# Patient Record
Sex: MALE | Race: WHITE | URBAN - METROPOLITAN AREA
[De-identification: names, ages, dates, MRNs, and addresses within clinical notes are randomized per-mention and may not be internally consistent; named-entity substitution may affect disease eponyms.]

---

## 2021-11-04 ENCOUNTER — OFFICE VISIT (OUTPATIENT)
Dept: SURGERY | Facility: CLINIC | Age: 56
End: 2021-11-04
Payer: COMMERCIAL

## 2021-11-04 VITALS
SYSTOLIC BLOOD PRESSURE: 138 MMHG | HEIGHT: 63 IN | RESPIRATION RATE: 16 BRPM | HEART RATE: 96 BPM | WEIGHT: 281 LBS | OXYGEN SATURATION: 98 % | BODY MASS INDEX: 49.79 KG/M2 | DIASTOLIC BLOOD PRESSURE: 89 MMHG

## 2021-11-04 DIAGNOSIS — R63.5 WEIGHT GAIN: ICD-10-CM

## 2021-11-04 DIAGNOSIS — I10 HTN (HYPERTENSION), BENIGN: ICD-10-CM

## 2021-11-04 DIAGNOSIS — E11.9 TYPE 2 DIABETES MELLITUS WITHOUT COMPLICATION, WITHOUT LONG-TERM CURRENT USE OF INSULIN (HCC): Primary | ICD-10-CM

## 2021-11-04 DIAGNOSIS — E78.5 DYSLIPIDEMIA: ICD-10-CM

## 2021-11-04 PROCEDURE — 3008F BODY MASS INDEX DOCD: CPT | Performed by: INTERNAL MEDICINE

## 2021-11-04 PROCEDURE — 99204 OFFICE O/P NEW MOD 45 MIN: CPT | Performed by: INTERNAL MEDICINE

## 2021-11-04 PROCEDURE — 3075F SYST BP GE 130 - 139MM HG: CPT | Performed by: INTERNAL MEDICINE

## 2021-11-04 PROCEDURE — 3079F DIAST BP 80-89 MM HG: CPT | Performed by: INTERNAL MEDICINE

## 2021-11-04 RX ORDER — GLIMEPIRIDE 4 MG/1
TABLET ORAL
COMMUNITY
Start: 2021-10-21

## 2021-11-04 RX ORDER — PIOGLITAZONEHYDROCHLORIDE 45 MG/1
TABLET ORAL
COMMUNITY
Start: 2021-10-21

## 2021-11-04 RX ORDER — FLUTICASONE PROPIONATE 50 MCG
SPRAY, SUSPENSION (ML) NASAL
COMMUNITY
Start: 2021-10-01

## 2021-11-04 RX ORDER — ESOMEPRAZOLE MAGNESIUM 40 MG/1
CAPSULE, DELAYED RELEASE ORAL
COMMUNITY
Start: 2021-10-21

## 2021-11-04 RX ORDER — LISINOPRIL AND HYDROCHLOROTHIAZIDE 12.5; 1 MG/1; MG/1
TABLET ORAL
COMMUNITY
Start: 2021-10-21

## 2021-11-04 RX ORDER — SEMAGLUTIDE 1.34 MG/ML
0.25 INJECTION, SOLUTION SUBCUTANEOUS
Qty: 1 EACH | Refills: 2 | Status: SHIPPED | OUTPATIENT
Start: 2021-11-04 | End: 2021-12-02

## 2021-11-04 RX ORDER — ROSUVASTATIN CALCIUM 5 MG/1
TABLET, COATED ORAL
COMMUNITY
Start: 2021-11-01

## 2021-11-04 NOTE — PROGRESS NOTES
[facility]ation Note       Patient:  Gianni Alta Sierra  :      10/30/1965  MRN:      [id number]    Referring Provider: Dr. Ted Gupta       Chief Complaint:  Patient presents with:  Consult  Weight Management      SUBJECTIVE     Hist osteoarthritis    Social History:  Social History    Socioeconomic History      Marital status: Not on file      Spouse name: Not on file      Number of children: Not on file      Years of education: Not on file      Highest education level: Not on file surgical history. Family History:  History reviewed. No pertinent family history.         Typical Dietary Intake:  Breakfast AM Snack Lunch PM Snack Dinner   Cheerios, almond milk, bagel with coffee, omlette  [place], soup, low carb tortillas, soda, darian ulcers. GERD:  The patient states his acid reflux has been well controlled with his current medication. No symptoms of heartburn or indigestion.       Encounter Diagnosis(ses):   Type 2 diabetes mellitus without complication, without long-term current u gain    Dyslipidemia        Jim Grande MD

## 2021-11-09 ENCOUNTER — TELEPHONE (OUTPATIENT)
Dept: SURGERY | Facility: CLINIC | Age: 56
End: 2021-11-09

## 2021-11-09 NOTE — TELEPHONE ENCOUNTER
Out office received P.A request form [address] [place] [address] regarding Ozempic medication. Office completed paperwork and faxed to insurance for approval, unable to locate patient under plan.  I contacted pt and I was informed that he picked up rx at [address] [place] [address] fo

## 2022-03-07 ENCOUNTER — OFFICE VISIT (OUTPATIENT)
Dept: SURGERY | Facility: CLINIC | Age: 57
End: 2022-03-07
Payer: COMMERCIAL

## 2022-03-07 VITALS
WEIGHT: 285.13 LBS | BODY MASS INDEX: 50.52 KG/M2 | DIASTOLIC BLOOD PRESSURE: 74 MMHG | HEIGHT: 63 IN | SYSTOLIC BLOOD PRESSURE: 115 MMHG | HEART RATE: 96 BPM | OXYGEN SATURATION: 95 %

## 2022-03-07 DIAGNOSIS — E11.9 TYPE 2 DIABETES MELLITUS WITHOUT COMPLICATION, WITHOUT LONG-TERM CURRENT USE OF INSULIN (HCC): Primary | ICD-10-CM

## 2022-03-07 DIAGNOSIS — E78.5 DYSLIPIDEMIA: ICD-10-CM

## 2022-03-07 DIAGNOSIS — I10 HTN (HYPERTENSION), BENIGN: ICD-10-CM

## 2022-03-07 DIAGNOSIS — E66.01 MORBID OBESITY WITH BMI OF 50.0-59.9, ADULT (HCC): ICD-10-CM

## 2022-03-07 DIAGNOSIS — R06.83 SNORING: ICD-10-CM

## 2022-03-07 PROCEDURE — 99214 OFFICE O/P EST MOD 30 MIN: CPT | Performed by: INTERNAL MEDICINE

## 2022-03-07 PROCEDURE — 3078F DIAST BP <80 MM HG: CPT | Performed by: INTERNAL MEDICINE

## 2022-03-07 PROCEDURE — 3008F BODY MASS INDEX DOCD: CPT | Performed by: INTERNAL MEDICINE

## 2022-03-07 PROCEDURE — 3074F SYST BP LT 130 MM HG: CPT | Performed by: INTERNAL MEDICINE

## 2022-03-07 RX ORDER — MONTELUKAST SODIUM 10 MG/1
1 TABLET ORAL DAILY
COMMUNITY
Start: 2022-01-20

## 2022-03-07 RX ORDER — IBUPROFEN 800 MG/1
TABLET ORAL
COMMUNITY
Start: 2021-12-21

## 2022-03-07 RX ORDER — SEMAGLUTIDE 1.34 MG/ML
0.25 INJECTION, SOLUTION SUBCUTANEOUS
COMMUNITY
Start: 2022-01-19 | End: 2022-03-07

## 2022-03-07 RX ORDER — SEMAGLUTIDE 1.34 MG/ML
0.25 INJECTION, SOLUTION SUBCUTANEOUS
Qty: 4.5 ML | Refills: 2 | Status: SHIPPED | OUTPATIENT
Start: 2022-03-07 | End: 2022-06-05

## 2022-06-15 ENCOUNTER — TELEMEDICINE (OUTPATIENT)
Dept: SURGERY | Facility: CLINIC | Age: 57
End: 2022-06-15

## 2022-06-15 VITALS — BODY MASS INDEX: 51.21 KG/M2 | HEIGHT: 63 IN | WEIGHT: 289 LBS

## 2022-06-15 DIAGNOSIS — E78.5 DYSLIPIDEMIA: ICD-10-CM

## 2022-06-15 DIAGNOSIS — I10 HTN (HYPERTENSION), BENIGN: ICD-10-CM

## 2022-06-15 DIAGNOSIS — E66.01 MORBID OBESITY WITH BMI OF 50.0-59.9, ADULT (HCC): ICD-10-CM

## 2022-06-15 DIAGNOSIS — R06.83 SNORING: ICD-10-CM

## 2022-06-15 DIAGNOSIS — E11.9 TYPE 2 DIABETES MELLITUS WITHOUT COMPLICATION, WITHOUT LONG-TERM CURRENT USE OF INSULIN (HCC): Primary | ICD-10-CM

## 2022-06-15 PROCEDURE — 99214 OFFICE O/P EST MOD 30 MIN: CPT | Performed by: INTERNAL MEDICINE

## 2022-06-15 PROCEDURE — 3008F BODY MASS INDEX DOCD: CPT | Performed by: INTERNAL MEDICINE

## 2022-06-15 RX ORDER — SEMAGLUTIDE 1.34 MG/ML
0.5 INJECTION, SOLUTION SUBCUTANEOUS
Qty: 4.5 ML | Refills: 2 | Status: SHIPPED | OUTPATIENT
Start: 2022-06-15

## 2022-09-06 ENCOUNTER — TELEPHONE (OUTPATIENT)
Dept: SURGERY | Facility: CLINIC | Age: 57
End: 2022-09-06

## 2023-01-19 ENCOUNTER — OFFICE VISIT (OUTPATIENT)
Dept: SURGERY | Facility: CLINIC | Age: 58
End: 2023-01-19
Payer: COMMERCIAL

## 2023-01-19 VITALS
HEART RATE: 88 BPM | HEIGHT: 63 IN | BODY MASS INDEX: 51.26 KG/M2 | DIASTOLIC BLOOD PRESSURE: 81 MMHG | OXYGEN SATURATION: 93 % | SYSTOLIC BLOOD PRESSURE: 117 MMHG | WEIGHT: 289.31 LBS

## 2023-01-19 DIAGNOSIS — E78.5 DYSLIPIDEMIA: ICD-10-CM

## 2023-01-19 DIAGNOSIS — E66.01 MORBID OBESITY WITH BMI OF 50.0-59.9, ADULT (HCC): ICD-10-CM

## 2023-01-19 DIAGNOSIS — I10 HTN (HYPERTENSION), BENIGN: ICD-10-CM

## 2023-01-19 DIAGNOSIS — R06.83 SNORING: ICD-10-CM

## 2023-01-19 DIAGNOSIS — E11.9 TYPE 2 DIABETES MELLITUS WITHOUT COMPLICATION, WITHOUT LONG-TERM CURRENT USE OF INSULIN (HCC): Primary | ICD-10-CM

## 2023-01-19 PROCEDURE — 3079F DIAST BP 80-89 MM HG: CPT | Performed by: INTERNAL MEDICINE

## 2023-01-19 PROCEDURE — 99214 OFFICE O/P EST MOD 30 MIN: CPT | Performed by: INTERNAL MEDICINE

## 2023-01-19 PROCEDURE — 3008F BODY MASS INDEX DOCD: CPT | Performed by: INTERNAL MEDICINE

## 2023-01-19 PROCEDURE — 3074F SYST BP LT 130 MM HG: CPT | Performed by: INTERNAL MEDICINE

## 2023-01-19 RX ORDER — TIRZEPATIDE 5 MG/.5ML
5 INJECTION, SOLUTION SUBCUTANEOUS WEEKLY
Qty: 6 ML | Refills: 1 | Status: SHIPPED | OUTPATIENT
Start: 2023-01-19

## 2023-01-25 ENCOUNTER — DOCUMENTATION ONLY (OUTPATIENT)
Dept: SURGERY | Facility: CLINIC | Age: 58
End: 2023-01-25

## 2023-02-02 DIAGNOSIS — E66.01 MORBID OBESITY WITH BMI OF 50.0-59.9, ADULT (HCC): ICD-10-CM

## 2023-02-02 DIAGNOSIS — I10 HTN (HYPERTENSION), BENIGN: ICD-10-CM

## 2023-02-02 DIAGNOSIS — E11.9 TYPE 2 DIABETES MELLITUS WITHOUT COMPLICATION, WITHOUT LONG-TERM CURRENT USE OF INSULIN (HCC): Primary | ICD-10-CM

## 2023-05-25 ENCOUNTER — OFFICE VISIT (OUTPATIENT)
Dept: SURGERY | Facility: CLINIC | Age: 58
End: 2023-05-25
Payer: COMMERCIAL

## 2023-05-25 VITALS
BODY MASS INDEX: 51.14 KG/M2 | DIASTOLIC BLOOD PRESSURE: 79 MMHG | WEIGHT: 288.63 LBS | HEIGHT: 63 IN | SYSTOLIC BLOOD PRESSURE: 120 MMHG | HEART RATE: 97 BPM | OXYGEN SATURATION: 92 %

## 2023-05-25 DIAGNOSIS — I10 HTN (HYPERTENSION), BENIGN: ICD-10-CM

## 2023-05-25 DIAGNOSIS — E78.5 DYSLIPIDEMIA: ICD-10-CM

## 2023-05-25 DIAGNOSIS — E66.01 MORBID OBESITY WITH BMI OF 50.0-59.9, ADULT (HCC): ICD-10-CM

## 2023-05-25 DIAGNOSIS — R06.83 SNORING: ICD-10-CM

## 2023-05-25 DIAGNOSIS — E11.9 TYPE 2 DIABETES MELLITUS WITHOUT COMPLICATION, WITHOUT LONG-TERM CURRENT USE OF INSULIN (HCC): Primary | ICD-10-CM

## 2023-05-25 PROCEDURE — 3078F DIAST BP <80 MM HG: CPT | Performed by: INTERNAL MEDICINE

## 2023-05-25 PROCEDURE — 3074F SYST BP LT 130 MM HG: CPT | Performed by: INTERNAL MEDICINE

## 2023-05-25 PROCEDURE — 99214 OFFICE O/P EST MOD 30 MIN: CPT | Performed by: INTERNAL MEDICINE

## 2023-05-25 PROCEDURE — 3008F BODY MASS INDEX DOCD: CPT | Performed by: INTERNAL MEDICINE

## 2023-10-12 ENCOUNTER — OFFICE VISIT (OUTPATIENT)
Dept: SURGERY | Facility: CLINIC | Age: 58
End: 2023-10-12
Payer: COMMERCIAL

## 2023-10-12 VITALS
DIASTOLIC BLOOD PRESSURE: 75 MMHG | SYSTOLIC BLOOD PRESSURE: 120 MMHG | WEIGHT: 272.19 LBS | BODY MASS INDEX: 48.23 KG/M2 | HEIGHT: 63 IN | HEART RATE: 98 BPM | OXYGEN SATURATION: 94 %

## 2023-10-12 DIAGNOSIS — I10 HTN (HYPERTENSION), BENIGN: ICD-10-CM

## 2023-10-12 DIAGNOSIS — E78.5 DYSLIPIDEMIA: ICD-10-CM

## 2023-10-12 DIAGNOSIS — E11.9 TYPE 2 DIABETES MELLITUS WITHOUT COMPLICATION, WITHOUT LONG-TERM CURRENT USE OF INSULIN (HCC): Primary | ICD-10-CM

## 2023-10-12 PROCEDURE — 99214 OFFICE O/P EST MOD 30 MIN: CPT | Performed by: INTERNAL MEDICINE

## 2023-10-12 PROCEDURE — 3008F BODY MASS INDEX DOCD: CPT | Performed by: INTERNAL MEDICINE

## 2023-10-12 PROCEDURE — 3074F SYST BP LT 130 MM HG: CPT | Performed by: INTERNAL MEDICINE

## 2023-10-12 PROCEDURE — 3078F DIAST BP <80 MM HG: CPT | Performed by: INTERNAL MEDICINE

## 2024-02-12 ENCOUNTER — OFFICE VISIT (OUTPATIENT)
Dept: ENDOCRINOLOGY CLINIC | Facility: CLINIC | Age: 59
End: 2024-02-12
Payer: COMMERCIAL

## 2024-02-12 VITALS
DIASTOLIC BLOOD PRESSURE: 81 MMHG | HEIGHT: 63 IN | SYSTOLIC BLOOD PRESSURE: 124 MMHG | HEART RATE: 103 BPM | BODY MASS INDEX: 49.43 KG/M2 | WEIGHT: 279 LBS

## 2024-02-12 DIAGNOSIS — R73.09 HIGH GLUCOSE LEVEL: ICD-10-CM

## 2024-02-12 DIAGNOSIS — E11.9 TYPE 2 DIABETES MELLITUS WITHOUT COMPLICATION, WITHOUT LONG-TERM CURRENT USE OF INSULIN (HCC): Primary | ICD-10-CM

## 2024-02-12 DIAGNOSIS — E11.65 UNCONTROLLED TYPE 2 DIABETES MELLITUS WITH HYPERGLYCEMIA (HCC): ICD-10-CM

## 2024-02-12 DIAGNOSIS — I10 HTN (HYPERTENSION), BENIGN: ICD-10-CM

## 2024-02-12 DIAGNOSIS — E78.5 DYSLIPIDEMIA: ICD-10-CM

## 2024-02-12 DIAGNOSIS — E11.65 HYPERGLYCEMIA DUE TO DIABETES MELLITUS (HCC): ICD-10-CM

## 2024-02-12 DIAGNOSIS — E66.01 CLASS 3 SEVERE OBESITY WITH BODY MASS INDEX (BMI) OF 45.0 TO 49.9 IN ADULT, UNSPECIFIED OBESITY TYPE, UNSPECIFIED WHETHER SERIOUS COMORBIDITY PRESENT (HCC): ICD-10-CM

## 2024-02-12 LAB
CARTRIDGE EXPIRATION DATE: ABNORMAL DATE
CARTRIDGE LOT#: ABNORMAL NUMERIC
GLUCOSE BLOOD: 236
HEMOGLOBIN A1C: 8.9 % (ref 4.3–5.6)
TEST STRIP EXPIRATION DATE: NORMAL DATE
TEST STRIP LOT #: NORMAL NUMERIC

## 2024-02-12 RX ORDER — SEMAGLUTIDE 0.68 MG/ML
INJECTION, SOLUTION SUBCUTANEOUS
COMMUNITY
Start: 2023-11-21 | End: 2024-02-12

## 2024-02-12 RX ORDER — ACYCLOVIR 400 MG/1
1 TABLET ORAL
Qty: 9 EACH | Refills: 1 | Status: SHIPPED | OUTPATIENT
Start: 2024-02-12 | End: 2024-08-10

## 2024-02-12 RX ORDER — EMPAGLIFLOZIN 25 MG/1
1 TABLET, FILM COATED ORAL DAILY
Qty: 270 TABLET | Refills: 0 | Status: SHIPPED | OUTPATIENT
Start: 2024-02-12 | End: 2024-05-12

## 2024-02-12 RX ORDER — TIRZEPATIDE 2.5 MG/.5ML
2.5 INJECTION, SOLUTION SUBCUTANEOUS
Qty: 2 ML | Refills: 3 | Status: SHIPPED | OUTPATIENT
Start: 2024-02-12

## 2024-02-12 RX ORDER — EMPAGLIFLOZIN 25 MG/1
1 TABLET, FILM COATED ORAL DAILY
Qty: 270 TABLET | Refills: 0 | Status: SHIPPED | OUTPATIENT
Start: 2024-02-12 | End: 2024-02-12

## 2024-02-12 RX ORDER — TIRZEPATIDE 2.5 MG/.5ML
2.5 INJECTION, SOLUTION SUBCUTANEOUS
Qty: 2 ML | Refills: 3 | Status: SHIPPED | OUTPATIENT
Start: 2024-02-12 | End: 2024-02-12

## 2024-02-12 RX ORDER — METFORMIN HYDROCHLORIDE 500 MG/1
1000 TABLET, EXTENDED RELEASE ORAL 2 TIMES DAILY WITH MEALS
Qty: 360 TABLET | Refills: 1 | Status: SHIPPED | OUTPATIENT
Start: 2024-02-12 | End: 2024-08-10

## 2024-02-12 NOTE — PATIENT INSTRUCTIONS
Target A1c 6.5%  Target BG   BEFORE MEAL 100-130mg/dl  2hrs AFTER MEAL less than 180mg/dl    Will give lab orders to do with PCP labs next month    Will get sleep study soon     Will refer to CDE     Will send Rx G7 dexcom     Follow up with podiatry and eye doctor annually.     .Metformin start with 500 mg daily for a week, increase to 500 mg twice a day for a week, then 1000 mg at night and 500 mg in the morning for a week, then increase to 1000 mg twice day. If getting diarrhea, wait and do not increase the dose till the diarrhea resolves.     Mounjaro 2.5 mg weekly - if not covered , will increase ozempic to 0.5 mg weekly     Pioglitzone 45 - might cause leg swelling so will consider 30 mg    Will add jardiance 25 mg     Basaglar 20 units     Continue crestor 20     Check blood sugar fasting, before meals and before bedtime.   If you have low blood sugar <70, take 15 grams of carb (8 oz juice or regular soda) and recheck in 15 minutes.      Patients need to wear covered shoes all the time and check feet daily.   Bring your meter/BG log to the next visit.        GLP-1 agonists:  No personal or family history of MEN syndrome   No personal history pancreatitis   Patient counselled regarding side effects including injection site reactions, nausea, vomiting, diarrhea, pancreatitis, gastroparesis and rare side effect rika Robinson syndrome.    SGLT2 inhibitors  No UTI or yeast infection   Discussed side effects including UTI and fungal infections.   Discussed the importance of hydration.

## 2024-02-12 NOTE — PROGRESS NOTES
New Patient Evaluation - History and Physical    CONSULT - Reason for Visit:  DM.  Requesting Physician:   SARBJIT CHAUHAN      CHIEF COMPLAINT:    Chief Complaint   Patient presents with    Consult     Diabetes   Testosterone  Thyroid    Wood Evans MD        HISTORY OF PRESENT ILLNESS:   Fredi Romano is a 58 year old male who presents with uncontrolled DM, DLP, obese     Was seen with his wife    Lab Results   Component Value Date    A1C 8.9 (A) 2024        Seeing Dr Draper   Does not want bariatric surgery now  Has GERD   He asked about testosterone and thyroid    Dx with Dmt2  5 yrs ago  Lowest A1c 7 but was up to 11 before   Has dexcom   Data showed TIR 2, high 45, very high 53, no low   GMI  N/A   and most of the time is higher     On   Metformin 1000 mg once a day , night dose gives him side effect  Pioglitazone 45 mg /day   Basaglar 20 units for the last 6 mo   Ozempic 0.25 - had GI SE from 1 mg . Lost wt on it   Stopped WRIGHT when started insulin     Worried about DM complications     [place]ired . Lives between [place] and [place].   Mother lives at home and has stress at home    in [place] he is more active   Gaining wt in [place].       DM quality measures:  A1C/Blood pressure: as reported above   Nephropathy screenin/2023 , not on ace /arb rx.   LIPID screenin/2023 .   On statin rx.   Last dilated eye exam: No data recorded    Exam shows retinopathy? No data recorded no  Last diabetic foot exam: No data recorded  Dentist : recommend every 6m  Neuropathy:  yes in hands but can be from carpel tunnel   CAD/ASCVD/PAD/CVA: no    GLP-1 agonists:  No personal or family history of MEN syndrome   No personal history pancreatitis   Patient counselled regarding side effects including injection site reactions, nausea, vomiting, diarrhea, pancreatitis, gastroparesis and rare side effect rika Robinson syndrome.    SGLT2 inhibitors  No UTI or yeast infection   Discussed side effects including  UTI and fungal infections.   Discussed the importance of hydration.    A/P  Uncontrolled complicated t2DM, hyperglycemia, obese , DLP  Had GI SE from ozempic   Will titrate up metformin, GLP-1/GIP, and add SGLT2i   Might lower TZD later     Target A1c 6.5%  Target BG   BEFORE MEAL 100-130mg/dl  2hrs AFTER MEAL less than 180mg/dl    Plan  Will give lab orders to do with PCP labs next month  Will get sleep study soon   Will refer to CDE  Will send Rx G7 dexcom   Follow up with podiatry and eye doctor annually.     .Metformin start with 500 mg daily for a week, increase to 500 mg twice a day for a week, then 1000 mg at night and 500 mg in the morning for a week, then increase to 1000 mg twice day. If getting diarrhea, wait and do not increase the dose till the diarrhea resolves.     Mounjaro 2.5 mg weekly - if not covered , will increase ozempic to 0.5 mg weekly  Pioglitzone 45 - might cause leg swelling so will consider 30 mg  Will add jardiance 25 mg   Basaglar 20 units   Continue crestor 20   Check blood sugar fasting, before meals and before bedtime.   If you have low blood sugar <70, take 15 grams of carb (8 oz juice or regular soda) and recheck in 15 minutes.      Patients need to wear covered shoes all the time and check feet daily.   Bring your meter/BG log to the next visit.      GLP-1 agonists:  No personal or family history of MEN syndrome   No personal history pancreatitis   Patient counselled regarding side effects including injection site reactions, nausea, vomiting, diarrhea, pancreatitis, gastroparesis and rare side effect rika Robinson syndrome.    SGLT2 inhibitors  No UTI or yeast infection   Discussed side effects including UTI and fungal infections.   Discussed the importance of hydration.        PAST MEDICAL HISTORY:   Past Medical History:   Diagnosis Date    DM2 (diabetes mellitus, type 2) ([facility])     Dyslipidemia     Morbid obesity with BMI of 45.0-49.9, adult ([facility])        PAST SURGICAL HISTORY:    History reviewed. No pertinent surgical history.  Knee surgery   Carpel tunnel      CURRENT MEDICATIONS:     metFORMIN  MG Oral Tablet 24 Hr Take 2 tablets (1,000 mg total) by mouth 2 (two) times daily with meals. 360 tablet 1    Empagliflozin (JARDIANCE) 25 MG Oral Tab Take 1 each by mouth daily. 270 tablet 0    Continuous Blood Gluc Sensor (DEXCOM G7 SENSOR) Does not apply Misc 1 each Every 10 days. Use as directed every 10 days 9 each 1    Tirzepatide (MOUNJARO) 2.5 MG/0.5ML Subcutaneous Solution Pen-injector Inject 2.5 mg into the skin every 7 days. 2 mL 3    montelukast 10 MG Oral Tab Take 1 tablet (10 mg total) by mouth daily.      ibuprofen 800 MG Oral Tab TAKE ONE TABLET BY MOUTH THREE TIMES DAILY FOR SEVERE PAIN      Esomeprazole Magnesium 40 MG Oral Capsule Delayed Release       lisinopril-hydroCHLOROthiazide 10-12.5 MG Oral Tab       rosuvastatin 5 MG Oral Tab       pioglitazone 45 MG Oral Tab       fluticasone propionate 50 MCG/ACT Nasal Suspension            ALLERGIES:  No Known Allergies    SOCIAL HISTORY:    Social History     Socioeconomic History    Marital status:    Tobacco Use    Smoking status: Never    Smokeless tobacco: Never   Substance and Sexual Activity    Alcohol use: Yes     Comment: occasionally    Drug use: Never       FAMILY HISTORY:   History reviewed. No pertinent family history.   DM in [place]  Mother with 2 TIAs   Aunt with dementia      REVIEW OF SYSTEMS:  All negative other than HPI      PHYSICAL EXAM:   Height: 5' 3\" (160 cm) (02/12 1447)  Weight: 279 lb (126.6 kg) (02/12 1447)  BSA (Calculated - sq m): 2.23 sq meters (02/12 1447)  Pulse: 103 (02/12 1447)  BP: 124/81 (02/12 1447)  Temp: --  Do Not Use - Resp Rate: --  SpO2: --    Body mass index is 49.42 kg/m².  Obese   Pedal edema   No goiter   No striae   Has skin tags    CONSTITUTIONAL:  Awake and alert. Age appropriate, good hygiene not in acute distress. Well nourished and well developed. no acute distress    PSYCH:   Orientated to time, place, person & situation, Normal mood and affect, memory intact, normal insight and judgment, cooperative  Neuro: speech is clear. Awake, alert, no aphasia, no facial asymmetry, no nuchal rigidity  EYES:  No proptosis, no ptosis, conjunctiva normal  ENT:  Normocephalic, atraumatic  Eye: EOMI, normal lids, no discharge, no conjunctival erythema. No exophthalmos/proptosis, Ptosis negative   No rhinorrhea, moist oral mucosa  Neck: full range of motion  Neck/Thyroid: neck inspection: normal, No scar, No goiter   LUNGS:  No acute respiratory distress, non-labored respiration. Speaking full sentences  CARDIOVASCULAR:  regular rate   ABDOMEN:  No abdominal pain/tender  SKIN:  no bruising or bleeding, no rashes and no lesions, Skin is dry, no obvious rashes or lesions  EXTREMITIES: no gross abnormality   MSK: Moves extremities spontaneously. full range of motion in all major joints      DATA:     Pertinent data reviewed     Component 09/29/2023 06/06/2023 01/18/2023 05/11/2022 09/22/2021 04/15/2021            TSH 0.75 1.10 1.00 0.87 1.10 0.88     LDL Cholesterol 77              No results for input(s): \"TSH\", \"T4F\", \"T3F\", \"THYP\" in the last 72 hours.  No results found.    POC HemoCue Glucose 201 (Finger stick glucose)        Component Value Flag Ref Range Units Status    GLUCOSE BLOOD 236        Final    Test Strip Lot # [id number]       Numeric Final    Test Strip Expiration Date 83,024       Date Final                  POC Glycohemoglobin [42561]        Component Value Flag Ref Range Units Status    HEMOGLOBIN A1C 8.9      4.3 - 5.6 % Final    Cartridge Lot# [id number]       Numeric Final    Cartridge Expiration Date 113,025       Date Final                    Orders Placed This Encounter   Procedures    POC HemoCue Glucose 201 (Finger stick glucose)    POC Glycohemoglobin [93863]    Microalb/Creat Ratio, Random Urine    Lipid Panel    Comp Metabolic Panel (14)    TSH W Reflex To Free T4      Orders Placed This Encounter    POC HemoCue Glucose 201 (Finger stick glucose)     Order Specific Question:   Release to patient     Answer:   Immediate    POC Glycohemoglobin [68577]     Order Specific Question:   Release to patient     Answer:   Immediate    Microalb/Creat Ratio, Random Urine     Standing Status:   Future     Standing Expiration Date:   2025     Order Specific Question:   Release to patient     Answer:   Immediate    Lipid Panel     Standing Status:   Future     Standing Expiration Date:   2025     Order Specific Question:   Release to patient     Answer:   Immediate    Comp Metabolic Panel (14)    TSH W Reflex To Free T4     Standing Status:   Future     Standing Expiration Date:   2025     Order Specific Question:   Release to patient     Answer:   Immediate    DISCONTD: OZEMPIC, 0.25 OR 0.5 MG/DOSE, 2 MG/3ML Subcutaneous Solution Pen-injector    metFORMIN  MG Oral Tablet 24 Hr     Sig: Take 2 tablets (1,000 mg total) by mouth 2 (two) times daily with meals.     Dispense:  360 tablet     Refill:  1    DISCONTD: Empagliflozin (JARDIANCE) 25 MG Oral Tab     Sig: Take 1 each by mouth daily.     Dispense:  270 tablet     Refill:  0    DISCONTD: Tirzepatide (MOUNJARO) 2.5 MG/0.5ML Subcutaneous Solution Pen-injector     Sig: Inject 2.5 mg into the skin every 7 days.     Dispense:  2 mL     Refill:  3    Empagliflozin (JARDIANCE) 25 MG Oral Tab     Sig: Take 1 each by mouth daily.     Dispense:  270 tablet     Refill:  0    Continuous Blood Gluc Sensor (DEXCOM G7 SENSOR) Does not apply Misc     Si each Every 10 days. Use as directed every 10 days     Dispense:  9 each     Refill:  1    Tirzepatide (MOUNJARO) 2.5 MG/0.5ML Subcutaneous Solution Pen-injector     Sig: Inject 2.5 mg into the skin every 7 days.     Dispense:  2 mL     Refill:  3          This is a specialized patient consultation in endocrinology and required comprehensive review of prior records, as well as  current evaluation, with time required for consideration of complex endocrine issues and consultation. For this visit, I personally interviewed the patient, and family member if accompanied, performed the pertinent parts of the history and physical examination. ROS included screening for appropriate endocrine conditions.   Today's diagnosis and plan were reviewed in detail with the patient who states understanding and agrees with plan. I discussed with the patient possible diagnosis, differential diagnosis, need for work up , treatment options, alternatives and side effects.     Please see note for details about time spent which includes:   · pre-visit preparation  · reviewing records  · face to face time with the patient   · timely documentation of the encounter  · ordering medications/tests  · communication with care team  · care coordination    I appreciate the opportunity to be part of your patient's medical care and will keep you, as the referring and primary physicians, informed about the care of your patient, including possible future surgery and pathology findings. Please feel free to contact me should you have any questions.      Rodney Staley MD

## 2024-03-11 ENCOUNTER — OFFICE VISIT (OUTPATIENT)
Dept: ENDOCRINOLOGY CLINIC | Facility: CLINIC | Age: 59
End: 2024-03-11

## 2024-03-11 VITALS
DIASTOLIC BLOOD PRESSURE: 60 MMHG | HEIGHT: 63 IN | SYSTOLIC BLOOD PRESSURE: 113 MMHG | BODY MASS INDEX: 48.37 KG/M2 | WEIGHT: 273 LBS | HEART RATE: 108 BPM

## 2024-03-11 DIAGNOSIS — E11.9 TYPE 2 DIABETES MELLITUS WITHOUT COMPLICATION, WITHOUT LONG-TERM CURRENT USE OF INSULIN (HCC): Primary | ICD-10-CM

## 2024-03-11 DIAGNOSIS — E11.65 HYPERGLYCEMIA DUE TO DIABETES MELLITUS (HCC): ICD-10-CM

## 2024-03-11 DIAGNOSIS — E66.01 CLASS 3 SEVERE OBESITY WITH BODY MASS INDEX (BMI) OF 45.0 TO 49.9 IN ADULT, UNSPECIFIED OBESITY TYPE, UNSPECIFIED WHETHER SERIOUS COMORBIDITY PRESENT (HCC): ICD-10-CM

## 2024-03-11 DIAGNOSIS — I10 HTN (HYPERTENSION), BENIGN: ICD-10-CM

## 2024-03-11 DIAGNOSIS — E78.5 DYSLIPIDEMIA: ICD-10-CM

## 2024-03-11 DIAGNOSIS — E11.65 UNCONTROLLED TYPE 2 DIABETES MELLITUS WITH HYPERGLYCEMIA (HCC): ICD-10-CM

## 2024-03-11 DIAGNOSIS — R73.09 HIGH GLUCOSE LEVEL: ICD-10-CM

## 2024-03-11 DIAGNOSIS — E11.649 HYPOGLYCEMIA ASSOCIATED WITH DIABETES (HCC): ICD-10-CM

## 2024-03-11 PROBLEM — E66.813 CLASS 3 SEVERE OBESITY WITH BODY MASS INDEX (BMI) OF 45.0 TO 49.9 IN ADULT: Status: ACTIVE | Noted: 2024-03-11

## 2024-03-11 PROBLEM — E66.813 CLASS 3 SEVERE OBESITY WITH BODY MASS INDEX (BMI) OF 45.0 TO 49.9 IN ADULT (HCC): Status: ACTIVE | Noted: 2024-03-11

## 2024-03-11 PROCEDURE — 3078F DIAST BP <80 MM HG: CPT | Performed by: INTERNAL MEDICINE

## 2024-03-11 PROCEDURE — 3074F SYST BP LT 130 MM HG: CPT | Performed by: INTERNAL MEDICINE

## 2024-03-11 PROCEDURE — 3008F BODY MASS INDEX DOCD: CPT | Performed by: INTERNAL MEDICINE

## 2024-03-11 PROCEDURE — 99214 OFFICE O/P EST MOD 30 MIN: CPT | Performed by: INTERNAL MEDICINE

## 2024-03-11 RX ORDER — TIRZEPATIDE 7.5 MG/.5ML
7.5 INJECTION, SOLUTION SUBCUTANEOUS
Qty: 2 ML | Refills: 0 | Status: SHIPPED | OUTPATIENT
Start: 2024-05-06 | End: 2024-06-03

## 2024-03-11 RX ORDER — DOCUSATE SODIUM 100 MG/1
100 CAPSULE, LIQUID FILLED ORAL 2 TIMES DAILY
Qty: 180 CAPSULE | Refills: 0 | Status: SHIPPED | OUTPATIENT
Start: 2024-03-11 | End: 2024-06-09

## 2024-03-11 RX ORDER — ACYCLOVIR 400 MG/1
1 TABLET ORAL
Qty: 9 EACH | Refills: 1 | Status: SHIPPED | OUTPATIENT
Start: 2024-03-11 | End: 2024-09-07

## 2024-03-11 RX ORDER — BLOOD SUGAR DIAGNOSTIC
1 STRIP MISCELLANEOUS 3 TIMES DAILY
Qty: 300 STRIP | Refills: 1 | Status: SHIPPED | OUTPATIENT
Start: 2024-03-11 | End: 2024-06-09

## 2024-03-11 RX ORDER — TIRZEPATIDE 5 MG/.5ML
5 INJECTION, SOLUTION SUBCUTANEOUS
Qty: 2 ML | Refills: 0 | Status: SHIPPED | OUTPATIENT
Start: 2024-04-08 | End: 2024-05-06

## 2024-03-11 RX ORDER — ROSUVASTATIN CALCIUM 20 MG/1
20 TABLET, COATED ORAL NIGHTLY
Qty: 90 TABLET | Refills: 0 | Status: SHIPPED | OUTPATIENT
Start: 2024-03-11 | End: 2024-06-09

## 2024-03-11 NOTE — PATIENT INSTRUCTIONS
Will give lab orders to do with PCP labs next month  Will get sleep study soon   Will refer to CDE  Will send Rx G7 dexcom and meter/strips  Follow up with podiatry and eye doctor annually.     . Metformin  1000 mg at night and 500 mg in the morning for a week, then increase to 1000 mg twice day. If getting diarrhea, wait and do not increase the dose till the diarrhea resolves.     Stop ozempic     Will start Mounjaro 2.5 mg weekly and titrate every month if tolerated 5 mg/week then 7.5mg/week   Pioglitzone 45 - might cause leg swelling so will consider 30 mg  Jardiance 25 mg   Basaglar  20 units   Crestor 10 -->  20 mg/day  Will get sleep study soon      Target BG   BEFORE MEAL 100-130mg/dl  2hrs AFTER MEAL less than 180mg/dl    Check blood sugar fasting, before meals and before bedtime.   If you have low blood sugar <70, take 15 grams of carb (8 oz juice or regular soda) and recheck in 15 minutes.      Patients need to wear covered shoes all the time and check feet daily.   Bring your meter/BG log to the next visit.

## 2024-03-11 NOTE — PROGRESS NOTES
follow up Visit:  DM.    Requesting Physician:   SARBJIT CHAUHAN      CHIEF COMPLAINT:    Chief Complaint   Patient presents with    Diabetes     Diabetes lasy A1c 8.9 24        HISTORY OF PRESENT ILLNESS:   Fredi Romano is a 58 year old male who presents with uncontrolled DM, DLP, obese     Lab Results   Component Value Date    A1C 8.9 (A) 2024        Seeing Dr Draper   Has GERD   Has allergy  Tired now - will get Sleep study soon   CGM fell in [place]   Started Jardiance     Dx with T2DM  5 yrs ago  Lowest A1c 7 but was up to 11 before   Has dexcom but did not use it recently - fell     Needs a new CGM and meter    Has mounjaro but has not started it   Started Jardiance and had low BG when was more active       On   Metformin 1000 mg once a day , night dose gives him side effect  Pioglitazone 45 mg /day   Jardiance 25  Basaglar 20 units for the last 6 mo   Ozempic 0.25 - had GI SE from 1 mg . Lost wt on it   Stopped WRIGHT when started insulin     Worried about DM complications     Retired . Lives between [place] and [place].   Mother lives at home and has stress at home    in [place] he is more active     DM quality measures:  A1C/Blood pressure: as reported above   Nephropathy screenin/2023 , not on ace /arb rx.   LIPID screenin/2023 .   On statin rx.   Last dilated eye exam: No data recorded    Exam shows retinopathy? No data recorded no  Last diabetic foot exam: No data recorded  Dentist : recommend every 6m  Neuropathy:  yes in hands but can be from carpel tunnel   CAD/ASCVD/PAD/CVA: no          A/P  Uncontrolled complicated t2DM, hyperglycemia, obese , DLP, hypoglycemia .    Might lower TZD later and insulin later     Target A1c 6.5%  Target BG   BEFORE MEAL 100-130mg/dl  2hrs AFTER MEAL less than 180mg/dl    Plan  Will give lab orders to do with PCP labs   Will get sleep study soon   Will refer to CDE  Will send Rx G7 dexcom and meter/strips  Follow up with podiatry and eye doctor  annually.     . Metformin  1000 mg at night and 500 mg in the morning for a week, then increase to 1000 mg twice day. If getting diarrhea, wait and do not increase the dose till the diarrhea resolves.     Stop ozempic     Will start Mounjaro 2.5 mg weekly and titrate every month if tolerated 5 mg/week then 7.5mg/week   Pioglitzone 45 - might cause leg swelling so will consider 30 mg  Jardiance 25 mg   Basaglar  20 units   Crestor 10 -->  20 mg/day  Will get sleep study soon      Target BG   BEFORE MEAL 100-130mg/dl  2hrs AFTER MEAL less than 180mg/dl    Check blood sugar fasting, before meals and before bedtime.   If you have low blood sugar <70, take 15 grams of carb (8 oz juice or regular soda) and recheck in 15 minutes.      Patients need to wear covered shoes all the time and check feet daily.   Bring your meter/BG log to the next visit.                PAST MEDICAL HISTORY:   Past Medical History:   Diagnosis Date    DM2 (diabetes mellitus, type 2) ([facility]     Dyslipidemia     Morbid obesity with BMI of 45.0-49.9, adult ([facility])        PAST SURGICAL HISTORY:   History reviewed. No pertinent surgical history.  Knee surgery   Carpel tunnel      CURRENT MEDICATIONS:     Glucose Blood (BLOOD GLUCOSE TEST STRIPS 333) In Vitro Strip 1 each by In Vitro route 3 (three) times daily. 300 strip 1    Blood Gluc Meter Disp-Strips Does not apply Device Check BG 3x/day 1 each 0    Continuous Blood Gluc Sensor (DEXCOM G7 SENSOR) Does not apply Misc 1 each Every 10 days. Use as directed every 10 days 9 each 1    docusate sodium 100 MG Oral Cap Take 1 capsule (100 mg total) by mouth 2 (two) times daily. 180 capsule 0    [START ON 4/8/2024] Tirzepatide (MOUNJARO) 5 MG/0.5ML Subcutaneous Solution Pen-injector Inject 5 mg into the skin every 7 days for 28 days. 2 mL 0    [START ON 5/6/2024] Tirzepatide (MOUNJARO) 7.5 MG/0.5ML Subcutaneous Solution Pen-injector Inject 7.5 mg into the skin every 7 days for 28 days. 2 mL 0    rosuvastatin  20 MG Oral Tab Take 1 tablet (20 mg total) by mouth nightly. 90 tablet 0    metFORMIN  MG Oral Tablet 24 Hr Take 2 tablets (1,000 mg total) by mouth 2 (two) times daily with meals. 360 tablet 1    Empagliflozin (JARDIANCE) 25 MG Oral Tab Take 1 each by mouth daily. 270 tablet 0    Continuous Blood Gluc Sensor (DEXCOM G7 SENSOR) Does not apply Misc 1 each Every 10 days. Use as directed every 10 days 9 each 1    Tirzepatide (MOUNJARO) 2.5 MG/0.5ML Subcutaneous Solution Pen-injector Inject 2.5 mg into the skin every 7 days. 2 mL 3    montelukast 10 MG Oral Tab Take 1 tablet (10 mg total) by mouth daily.      ibuprofen 800 MG Oral Tab TAKE ONE TABLET BY MOUTH THREE TIMES DAILY FOR SEVERE PAIN      Esomeprazole Magnesium 40 MG Oral Capsule Delayed Release       lisinopril-hydroCHLOROthiazide 10-12.5 MG Oral Tab       pioglitazone 45 MG Oral Tab       fluticasone propionate 50 MCG/ACT Nasal Suspension            ALLERGIES:  No Known Allergies    SOCIAL HISTORY:    Social History     Socioeconomic History    Marital status:    Tobacco Use    Smoking status: Never    Smokeless tobacco: Never   Substance and Sexual Activity    Alcohol use: Yes     Comment: occasionally    Drug use: Never       FAMILY HISTORY:   History reviewed. No pertinent family history.   DM in [place]  Mother with 2 TIAs   Aunt with dementia           PHYSICAL EXAM:   Height: 5' 3\" (160 cm) (03/11 1009)  Weight: 273 lb (123.8 kg) (03/11 1009)  BSA (Calculated - sq m): 2.21 sq meters (03/11 1009)  Pulse: 108 (03/11 1009)  BP: 113/60 (03/11 1009)  Temp: --  Do Not Use - Resp Rate: --  SpO2: --    Body mass index is 48.36 kg/m².  Obese   Pedal edema   No goiter   No striae   Has skin tags    CONSTITUTIONAL:  Awake and alert. Age appropriate, good hygiene not in acute distress. Well nourished and well developed. no acute distress   PSYCH:   Orientated to time, place, person & situation, Normal mood and affect, memory intact, normal insight and  judgment, cooperative       DATA:     Pertinent data reviewed     Component 2023 2023 2023 2022 2021 04/15/2021            TSH 0.75 1.10 1.00 0.87 1.10 0.88     LDL Cholesterol 77              No results for input(s): \"TSH\", \"T4F\", \"T3F\", \"THYP\" in the last 72 hours.  No results found.          No orders of the defined types were placed in this encounter.    Orders Placed This Encounter    Glucose Blood (BLOOD GLUCOSE TEST STRIPS 333) In Vitro Strip     Si each by In Vitro route 3 (three) times daily.     Dispense:  300 strip     Refill:  1    Blood Gluc Meter Disp-Strips Does not apply Device     Sig: Check BG 3x/day     Dispense:  1 each     Refill:  0    Continuous Blood Gluc Sensor (DEXCOM G7 SENSOR) Does not apply Misc     Si each Every 10 days. Use as directed every 10 days     Dispense:  9 each     Refill:  1    docusate sodium 100 MG Oral Cap     Sig: Take 1 capsule (100 mg total) by mouth 2 (two) times daily.     Dispense:  180 capsule     Refill:  0    Tirzepatide (MOUNJARO) 5 MG/0.5ML Subcutaneous Solution Pen-injector     Sig: Inject 5 mg into the skin every 7 days for 28 days.     Dispense:  2 mL     Refill:  0    Tirzepatide (MOUNJARO) 7.5 MG/0.5ML Subcutaneous Solution Pen-injector     Sig: Inject 7.5 mg into the skin every 7 days for 28 days.     Dispense:  2 mL     Refill:  0    rosuvastatin 20 MG Oral Tab     Sig: Take 1 tablet (20 mg total) by mouth nightly.     Dispense:  90 tablet     Refill:  0          This is a specialized patient consultation in endocrinology and required comprehensive review of prior records, as well as current evaluation, with time required for consideration of complex endocrine issues and consultation. For this visit, I personally interviewed the patient, and family member if accompanied, performed the pertinent parts of the history and physical examination. ROS included screening for appropriate endocrine conditions.   Today's  diagnosis and plan were reviewed in detail with the patient who states understanding and agrees with plan. I discussed with the patient possible diagnosis, differential diagnosis, need for work up , treatment options, alternatives and side effects.     Please see note for details about time spent which includes:   · pre-visit preparation  · reviewing records  · face to face time with the patient   · timely documentation of the encounter  · ordering medications/tests  · communication with care team  · care coordination    I appreciate the opportunity to be part of your patient's medical care and will keep you, as the referring and primary physicians, informed about the care of your patient, including possible future surgery and pathology findings. Please feel free to contact me should you have any questions.      Rodney Staley MD

## 2024-03-13 ENCOUNTER — TELEPHONE (OUTPATIENT)
Dept: ENDOCRINOLOGY CLINIC | Facility: CLINIC | Age: 59
End: 2024-03-13

## 2024-03-13 RX ORDER — LANCETS
EACH MISCELLANEOUS
Qty: 300 EACH | Refills: 1 | Status: SHIPPED | OUTPATIENT
Start: 2024-03-13

## 2024-03-13 NOTE — TELEPHONE ENCOUNTER
[facility] calling regarding rx testing supplies, but missing script for rx Accucheck guide lancets, thanks.

## 2024-03-14 ENCOUNTER — OFFICE VISIT (OUTPATIENT)
Dept: SURGERY | Facility: CLINIC | Age: 59
End: 2024-03-14
Payer: COMMERCIAL

## 2024-03-14 VITALS
SYSTOLIC BLOOD PRESSURE: 126 MMHG | OXYGEN SATURATION: 95 % | HEIGHT: 63 IN | HEART RATE: 91 BPM | WEIGHT: 272.81 LBS | DIASTOLIC BLOOD PRESSURE: 76 MMHG | BODY MASS INDEX: 48.34 KG/M2

## 2024-03-14 DIAGNOSIS — I10 HTN (HYPERTENSION), BENIGN: ICD-10-CM

## 2024-03-14 DIAGNOSIS — E11.9 TYPE 2 DIABETES MELLITUS WITHOUT COMPLICATION, WITHOUT LONG-TERM CURRENT USE OF INSULIN (HCC): Primary | ICD-10-CM

## 2024-03-14 DIAGNOSIS — E78.5 DYSLIPIDEMIA: ICD-10-CM

## 2024-03-14 PROCEDURE — 99214 OFFICE O/P EST MOD 30 MIN: CPT | Performed by: INTERNAL MEDICINE

## 2024-03-14 PROCEDURE — 3008F BODY MASS INDEX DOCD: CPT | Performed by: INTERNAL MEDICINE

## 2024-03-14 PROCEDURE — 3074F SYST BP LT 130 MM HG: CPT | Performed by: INTERNAL MEDICINE

## 2024-03-14 PROCEDURE — 3078F DIAST BP <80 MM HG: CPT | Performed by: INTERNAL MEDICINE

## 2024-03-14 NOTE — PROGRESS NOTES
[facility], [facility], [place]  [address]  [facility] [address]  Dept: [phone]       Patient:  Fredi Romano  :      10/30/1965  MRN:      [id number]    Chief Complaint:    Chief Complaint   Patient presents with    Follow - Up    Weight Management     Weight check        SUBJECTIVE     History of Present Illness:  Fredi is being seen today for a follow-up for non surgical weight loss.     Past Medical History:   Past Medical History:   Diagnosis Date    DM2 (diabetes mellitus, type 2) ([facility])     Dyslipidemia     Morbid obesity with BMI of 45.0-49.9, adult ([facility])         Comorbidities:  Joint pain-Improvement?  yes, GERD-Improvement?  yes, Diabetes-Improvement?  yes, ELÍAS-Improvement?  yes, Snoring-Improvement?  yes and Sleep apnea-Improvement?  yes    OBJECTIVE     Vitals: /76   Pulse 91   Ht 5' 3\" (1.6 m)   Wt 272 lb 12.8 oz (123.7 kg)   SpO2 95%   BMI 48.32 kg/m²     Initial weight loss: -00   Total weight loss: -09   Start weight: 281    Wt Readings from Last 3 Encounters:   24 272 lb 12.8 oz (123.7 kg)   24 273 lb (123.8 kg)   24 279 lb (126.6 kg)       Patient Medications:    Current Outpatient Medications   Medication Sig Dispense Refill    Accu-Chek FastClix Lancets Does not apply Misc Use to check blood sugar three times a day 300 each 1    Glucose Blood (BLOOD GLUCOSE TEST STRIPS 333) In Vitro Strip 1 each by In Vitro route 3 (three) times daily. 300 strip 1    Blood Gluc Meter Disp-Strips Does not apply Device Check BG 3x/day 1 each 0    Continuous Blood Gluc Sensor (DEXCOM G7 SENSOR) Does not apply Misc 1 each Every 10 days. Use as directed every 10 days 9 each 1    docusate sodium 100 MG Oral Cap Take 1 capsule (100 mg total) by mouth 2 (two) times daily. 180 capsule 0    [START ON 2024] Tirzepatide (MOUNJARO) 5 MG/0.5ML Subcutaneous Solution Pen-injector Inject 5 mg into the skin every 7 days for 28  days. 2 mL 0    [START ON 5/6/2024] Tirzepatide (MOUNJARO) 7.5 MG/0.5ML Subcutaneous Solution Pen-injector Inject 7.5 mg into the skin every 7 days for 28 days. 2 mL 0    rosuvastatin 20 MG Oral Tab Take 1 tablet (20 mg total) by mouth nightly. 90 tablet 0    metFORMIN  MG Oral Tablet 24 Hr Take 2 tablets (1,000 mg total) by mouth 2 (two) times daily with meals. 360 tablet 1    Empagliflozin (JARDIANCE) 25 MG Oral Tab Take 1 each by mouth daily. 270 tablet 0    Continuous Blood Gluc Sensor (DEXCOM G7 SENSOR) Does not apply Misc 1 each Every 10 days. Use as directed every 10 days 9 each 1    Tirzepatide (MOUNJARO) 2.5 MG/0.5ML Subcutaneous Solution Pen-injector Inject 2.5 mg into the skin every 7 days. 2 mL 3    montelukast 10 MG Oral Tab Take 1 tablet (10 mg total) by mouth daily.      ibuprofen 800 MG Oral Tab TAKE ONE TABLET BY MOUTH THREE TIMES DAILY FOR SEVERE PAIN      Esomeprazole Magnesium 40 MG Oral Capsule Delayed Release       glimepiride 4 MG Oral Tab  (Patient not taking: Reported on 2/12/2024)      lisinopril-hydroCHLOROthiazide 10-12.5 MG Oral Tab       pioglitazone 45 MG Oral Tab       fluticasone propionate 50 MCG/ACT Nasal Suspension        Allergies:  Patient has no known allergies.     Social History:    Social History     Socioeconomic History    Marital status:      Spouse name: Not on file    Number of children: Not on file    Years of education: Not on file    Highest education level: Not on file   Occupational History    Not on file   Tobacco Use    Smoking status: Never    Smokeless tobacco: Never   Substance and Sexual Activity    Alcohol use: Yes     Comment: occasionally    Drug use: Never    Sexual activity: Not on file   Other Topics Concern    Not on file   Social History Narrative    Not on file     Social Determinants of Health     Financial Resource Strain: Not on file   Food Insecurity: Not on file   Transportation Needs: Not on file   Physical Activity: Not on file    Stress: Not on file   Social Connections: Not on file   Housing Stability: Not on file     Surgical History:  No past surgical history on file.  Family History:  No family history on file.    Food Journal  Reviewed and Discussed:       Patient has a Food Journal?: yes   Patient is reading nutrition labels?  yes  Average Caloric Intake:     Average CHO Intake: 120  Is patient exercising? yes  Type of exercise? Continue going to the gym    Eating Habits  Patient states the following:  Eats 3 meal(s) per day  Length of time it takes to consume a meal:  20  # of snacks per day: 1 Type of snacks:  Meat and cheese  Amount of soda consumption per day:  diet  Amount of water (in ounces) per day:  inad  Drinking between meals only:  yes  Toughest challenge:  Exercise     Nutritional Goals  Limit carbohydrates to 100 gms per day, Eat 100-200 calories within 1 hour of waking  and Eat 3-4 cups of fresh fruits or vegetables daily    Behavior Modifications Reviewed and Discussed  Eat breakfast, Eat 3 meals per day, Plan meals in advance, Read nutrition labels, Drink 64 oz of water per day, Maintain a daily food journal, No drinking 30 minutes before or after meals, Utlize portion control strategies to reduce calorie intake, Identify triggers for eating and manage cues and Eat slowly and take 20 to 30 minutes to complete each meal    Exercise Goals Reviewed and Discussed    Continue with increase activity levels    ROS:    Constitutional: positive for fatigue  Respiratory: positive for dyspnea on exertion  Cardiovascular: negative  Gastrointestinal: negative  Musculoskeletal:positive for arthralgias and back pain  Neurological: negative  Behavioral/Psych: negative  Endocrine: negative  All other systems were reviewed and are negative    Physical Exam:   General appearance: alert, appears stated age, cooperative and morbidly obese  Head: Normocephalic, without obvious abnormality, atraumatic  Lungs: clear to auscultation  bilaterally  Heart: S1, S2 normal, no murmur, click, rub or gallop, regular rate and rhythm  Abdomen:  firm, obese, non tender  Extremities: extremities normal, atraumatic, no cyanosis or edema  Pulses: 2+ and symmetric  Skin: Skin color, texture, turgor normal. No rashes or lesions  Neurologic: Grossly normal    ASSESSMENT     DIABETES:  The patient denies any episodes of hypoglycemia since his last clinic visit.  he denies any lower extremity skin breakdown or foot ulcers.    HYPERTENSION:  The patient's blood pressure has been well controlled.  he has been checking it as instructed and has remained in relatively good control.    HYPERCHOLESTEROLEMIA:  The patient states that his cholesterol has been well controlled on his current medication.    No results found for: \"CHOLEST\", \"LDL\", \"HDL\", \"TRIG\", \"VLDL\", \"TCHDLRATIO\"    Encounter Diagnosis(ses):   Encounter Diagnoses   Name Primary?    Type 2 diabetes mellitus without complication, without long-term current use of insulin (HCC) Yes    HTN (hypertension), benign     Dyslipidemia     Body mass index (BMI) 45.0-49.9, adult (HCC)        PLAN   Given the patient's age, degree of obesity and multiple medical problems outlined above, I do believe that bariatric surgery may prove beneficial if the patient is unable to lose at least 20% of his weight after 6 months time. Further consideration for bariatric surgery will be discussed at upcoming clinic visits.        HYPERTENSION: Blood pressure stable on the above medications. No interval change in antihypertensive medication.     DIABETES: Continue current medications.    DYSLIPIDEMIA: Stable on the above prescribed meal plan and medication. Liver function stable.    No results found for: \"CHOLEST\", \"LDL\", \"HDL\", \"TRIG\", \"VLDL\", \"TCHDLRATIO\"    OBSTRUCTIVE SLEEP APNEA: Given the patient's history suggestive of obstructive sleep apnea as outlined above, consideration for obtaining a sleep study may be warranted.       Goals for next month:  1. Keep a food log.  2. Drink 48-64 ounces of non-caloric beverages per day. No fruit juices or regular soda.  3. Increase activity-upper body exercises, walk 10 minutes per day.  4. Increase fruit and vegetable servings to 5-6 per day.      Attended seminar    Wants to continue medical management for now    Plans to start Mounjaro per Endo team    Should follow up with PCP as scheduled    Should get sleep study done    Diagnoses and all orders for this visit:    Type 2 diabetes mellitus without complication, without long-term current use of insulin (HCC)    HTN (hypertension), benign    Dyslipidemia    Body mass index (BMI) 45.0-49.9, adult (HCC)          Freddy Draper MD

## 2024-03-19 ENCOUNTER — HOSPITAL ENCOUNTER (OUTPATIENT)
Dept: ENDOCRINOLOGY | Facility: HOSPITAL | Age: 59
Discharge: HOME | End: 2024-03-19
Attending: INTERNAL MEDICINE
Payer: COMMERCIAL

## 2024-03-19 VITALS — BODY MASS INDEX: 49 KG/M2 | WEIGHT: 275 LBS

## 2024-03-19 DIAGNOSIS — E11.9 TYPE 2 DIABETES MELLITUS WITHOUT COMPLICATION, WITHOUT LONG-TERM CURRENT USE OF INSULIN (HCC): Primary | ICD-10-CM

## 2024-03-19 NOTE — PROGRESS NOTES
Fredi Romano : 10/30/1965  attended individual initial assessment for Diabetes Education:    Date: 3/19/2024         Start time: 0800 End time: 0910    HEMOGLOBIN A1C (%)   Date Value   2024 8.9 (A)       Wt Readings from Last 1 Encounters:   24 275 lb       Assessment:   Fredi has family history of diabetes. He recently started administering Mounjaro medication. He lives in [place] part of the time and finds it easier to incorporate exercise and activity when living in [place].  He will be having a sleep study evaluation in the future.  He admits he omits his Metformin medication at the dinner meal.  Discussed strategies and reinforced the importance of taking medications as directed.    Diet Hx:   B: eggs (2), cereal, berries, fruit salad, coffee (2)   L: sandwich (limit bread); salad, protein,  Leftovers, water, tea (unsweetened)  D:smaller or skips,     Snacks:  Ice cream after dinner, occasional sweets  Yogurt or fruit    Education provided on the below topics:     Diabetes Overview:  Pathophysiology, A1C results and treatment options for diabetes self-management reviewed.   Described basic process and treatment options for diabetes self-management.  Introduced long term impact of uncontrolled blood glucose on health.    Healthy Eating:  Obtained usual diet history.  Instructed on Basic Diet Guidelines which includes eating 3 meals/day. Eat moderate amounts at consistent times from day to day. Limit/avoid sweets. Instructed on Balanced Plate Method of meal planning. Instructed to limit grains or starchy vegetables to ¼ of plate, protein to ¼ of plate, non-starchy vegetables to ½ plate and modest portions of fruit, yogurt, milk as desired.    Being Active:   Encouraged Physical Activity and briefly reviewed ADA recommended guidelines for activity with type 2 diabetes.    Taking Medications:   Reviewed current diabetes medications (if applicable).    Monitoring:  Instructed/reinforced blood  glucose monitoring, testing schedules and target goals:   Fasting / Pre-meal  mg/dL 2 Hour Post-prandial <180 mg/dL  Currently wearing the Dexcom G7.  Time in Range 41% past 14 days.    Problem Solving:   Prevention, detection and treatment of acute complications: taught symptoms of hypoglycemia, hyperglycemia, how to treat low blood sugar (Rule of 15) and actions for lowering high blood glucose levels.     Behavior Change:  Initiated individualized goal setting process and will continue to refine throughout class series.    Recommendations:      Monitor blood glucose as directed.  Follow Balance Plate method of meal planning.   Attend all Group Class in series -starting in May      Written materials provided for all areas covered.  Patient verbalized understanding and all questions answered.    Kaylyn George RN

## 2024-03-27 ENCOUNTER — TELEPHONE (OUTPATIENT)
Dept: ENDOCRINOLOGY CLINIC | Facility: CLINIC | Age: 59
End: 2024-03-27

## 2024-03-27 NOTE — TELEPHONE ENCOUNTER
Current Outpatient Medications    Empagliflozin (JARDIANCE) 25 MG Oral Tab Take 1 each by mouth daily. 270 tablet 0       Too soon to fill

## 2024-05-09 ENCOUNTER — APPOINTMENT (OUTPATIENT)
Dept: ENDOCRINOLOGY | Facility: HOSPITAL | Age: 59
End: 2024-05-09
Attending: INTERNAL MEDICINE

## 2024-05-13 ENCOUNTER — OFFICE VISIT (OUTPATIENT)
Dept: ENDOCRINOLOGY CLINIC | Facility: CLINIC | Age: 59
End: 2024-05-13
Payer: COMMERCIAL

## 2024-05-13 VITALS
WEIGHT: 269 LBS | DIASTOLIC BLOOD PRESSURE: 66 MMHG | SYSTOLIC BLOOD PRESSURE: 99 MMHG | HEART RATE: 100 BPM | BODY MASS INDEX: 47.66 KG/M2 | HEIGHT: 63 IN

## 2024-05-13 DIAGNOSIS — E11.65 UNCONTROLLED TYPE 2 DIABETES MELLITUS WITH HYPERGLYCEMIA (HCC): Primary | ICD-10-CM

## 2024-05-13 DIAGNOSIS — E66.01 CLASS 3 SEVERE OBESITY WITH BODY MASS INDEX (BMI) OF 45.0 TO 49.9 IN ADULT, UNSPECIFIED OBESITY TYPE, UNSPECIFIED WHETHER SERIOUS COMORBIDITY PRESENT (HCC): ICD-10-CM

## 2024-05-13 DIAGNOSIS — E78.5 DYSLIPIDEMIA: ICD-10-CM

## 2024-05-13 DIAGNOSIS — E11.649 HYPOGLYCEMIA ASSOCIATED WITH DIABETES (HCC): ICD-10-CM

## 2024-05-13 DIAGNOSIS — I10 HTN (HYPERTENSION), BENIGN: ICD-10-CM

## 2024-05-13 DIAGNOSIS — R73.09 HIGH GLUCOSE LEVEL: ICD-10-CM

## 2024-05-13 DIAGNOSIS — E11.9 TYPE 2 DIABETES MELLITUS WITHOUT COMPLICATION, WITHOUT LONG-TERM CURRENT USE OF INSULIN (HCC): ICD-10-CM

## 2024-05-13 DIAGNOSIS — E11.65 HYPERGLYCEMIA DUE TO DIABETES MELLITUS (HCC): ICD-10-CM

## 2024-05-13 LAB
CARTRIDGE EXPIRATION DATE: ABNORMAL DATE
HEMOGLOBIN A1C: 8 % (ref 4.3–5.6)

## 2024-05-13 PROCEDURE — 99214 OFFICE O/P EST MOD 30 MIN: CPT | Performed by: INTERNAL MEDICINE

## 2024-05-13 PROCEDURE — 83036 HEMOGLOBIN GLYCOSYLATED A1C: CPT | Performed by: INTERNAL MEDICINE

## 2024-05-13 PROCEDURE — 3008F BODY MASS INDEX DOCD: CPT | Performed by: INTERNAL MEDICINE

## 2024-05-13 PROCEDURE — 3074F SYST BP LT 130 MM HG: CPT | Performed by: INTERNAL MEDICINE

## 2024-05-13 PROCEDURE — 3078F DIAST BP <80 MM HG: CPT | Performed by: INTERNAL MEDICINE

## 2024-05-13 RX ORDER — PIOGLITAZONEHYDROCHLORIDE 45 MG/1
45 TABLET ORAL DAILY
Qty: 90 TABLET | Refills: 0 | Status: SHIPPED | OUTPATIENT
Start: 2024-05-13

## 2024-05-13 RX ORDER — TIRZEPATIDE 7.5 MG/.5ML
7.5 INJECTION, SOLUTION SUBCUTANEOUS
Qty: 2 ML | Refills: 0 | Status: SHIPPED | OUTPATIENT
Start: 2024-05-13 | End: 2024-06-10

## 2024-05-13 RX ORDER — ROSUVASTATIN CALCIUM 20 MG/1
20 TABLET, COATED ORAL NIGHTLY
Qty: 90 TABLET | Refills: 0 | Status: SHIPPED | OUTPATIENT
Start: 2024-05-13 | End: 2024-08-11

## 2024-05-13 RX ORDER — METFORMIN HYDROCHLORIDE 500 MG/1
1000 TABLET, EXTENDED RELEASE ORAL 2 TIMES DAILY WITH MEALS
Qty: 360 TABLET | Refills: 1 | Status: SHIPPED | OUTPATIENT
Start: 2024-05-13 | End: 2024-11-09

## 2024-05-13 NOTE — PROGRESS NOTES
follow up Visit:  DM.    Requesting Physician:   SARBJIT CHAUHAN      CHIEF COMPLAINT:    Chief Complaint   Patient presents with    Diabetes     Check A1c         HISTORY OF PRESENT ILLNESS:   Fredi Romano is a 58 year old male who presents with uncontrolled DM, DLP, obese     Lab Results   Component Value Date    A1C 8.9 (A) 2024        Seeing Dr Bonny Castillo with T2DM  >5 yrs ago  Lowest A1c 7, but was up to 11 before      Hurt his leg in [place] so was not walking as before  CGMs sensors failed   We reviewed the available data on the current one   Was off mounjaro for 3-4 weeks   He lost wt     On   Metformin 1000 mg once a day , night dose gives him side effect  Pioglitazone 45 mg /day   Jardiance 25 mg /day  Basaglar 20 units    mounjaro  2.5 mg /week    Ozempic 0.25 - had GI SE from 1 mg    Stopped WRIGHT when started insulin     Worried about DM complications     Retired . Lives between [place] and [place].   Mother lives at home and has stress at home   he is more active in [place]     DM quality measures:  A1C/Blood pressure: as reported above   Nephropathy screenin/2023 , not on ace /arb rx.   LIPID screenin/2023 .   On statin rx.   Last dilated eye exam: No data recorded    Exam shows retinopathy? No data recorded no  Last diabetic foot exam: No data recorded  Dentist : recommend every 6m  Neuropathy:  yes in hands but can be from carpel tunnel   CAD/ASCVD/PAD/CVA: no        Lab Results   Component Value Date    A1C 8.9 (A) 2024      Wt Readings from Last 6 Encounters:   24 269 lb (122 kg)   24 275 lb (124.7 kg)   24 272 lb 12.8 oz (123.7 kg)   24 273 lb (123.8 kg)   24 279 lb (126.6 kg)   10/12/23 272 lb 3.2 oz (123.5 kg)         A/P  Uncontrolled complicated t2DM, hyperglycemia, obese , DLP, hypoglycemia .    Might lower TZD  and insulin later when we titrate up mounjaro     Target A1c 6.5%  Target BG   BEFORE MEAL 100-130mg/dl  2hrs AFTER MEAL  less than 180mg/dl    Plan  lab  sleep study   consult CDE  Likes G7 dexcom and will continue     Follow up with podiatry and eye doctor annually.     . Metformin  1000 mg at night and 500 mg in the morning for a week, then increase to 1000 mg twice day. If getting diarrhea, wait and do not increase the dose till the diarrhea resolves.      Every month increase  Mounjaro 2.5 ->5 mg/wkly -->7.5  mg weekly    Pioglitzone 45 - might cause leg swelling so will consider 30 mg  Jardiance 25 mg   Basaglar  20 units   Crestor  20 mg/day      Target BG   BEFORE MEAL 100-130mg/dl  2hrs AFTER MEAL less than 180mg/dl    Check blood sugar fasting, before meals and before bedtime.   If you have low blood sugar <70, take 15 grams of carb (8 oz juice or regular soda) and recheck in 15 minutes.      Patients need to wear covered shoes all the time and check feet daily.   Bring your meter/BG log to the next visit.          PAST MEDICAL HISTORY:   Past Medical History:    DM2 (diabetes mellitus, type 2) ([facility])    Dyslipidemia    Morbid obesity with BMI of 45.0-49.9, adult ([facility])       PAST SURGICAL HISTORY:   History reviewed. No pertinent surgical history.  Knee surgery   Carpel tunnel      CURRENT MEDICATIONS:     pioglitazone 45 MG Oral Tab Take 1 tablet (45 mg total) by mouth daily. 90 tablet 0    insulin glargine 100 UNIT/ML Subcutaneous Solution Pen-injector Inject 20 Units into the skin daily. 15 mL 0    metFORMIN  MG Oral Tablet 24 Hr Take 2 tablets (1,000 mg total) by mouth 2 (two) times daily with meals. 360 tablet 1    rosuvastatin 20 MG Oral Tab Take 1 tablet (20 mg total) by mouth nightly. 90 tablet 0    Tirzepatide (MOUNJARO) 7.5 MG/0.5ML Subcutaneous Solution Pen-injector Inject 7.5 mg into the skin every 7 days for 28 days. 2 mL 0    Accu-Chek FastClix Lancets Does not apply Misc Use to check blood sugar three times a day 300 each 1    Glucose Blood (BLOOD GLUCOSE TEST STRIPS 333) In Vitro Strip 1 each by In Vitro  route 3 (three) times daily. 300 strip 1    Blood Gluc Meter Disp-Strips Does not apply Device Check BG 3x/day 1 each 0    Continuous Blood Gluc Sensor (DEXCOM G7 SENSOR) Does not apply Misc 1 each Every 10 days. Use as directed every 10 days 9 each 1    docusate sodium 100 MG Oral Cap Take 1 capsule (100 mg total) by mouth 2 (two) times daily. 180 capsule 0    Continuous Blood Gluc Sensor (DEXCOM G7 SENSOR) Does not apply Misc 1 each Every 10 days. Use as directed every 10 days 9 each 1    montelukast 10 MG Oral Tab Take 1 tablet (10 mg total) by mouth daily.      ibuprofen 800 MG Oral Tab TAKE ONE TABLET BY MOUTH THREE TIMES DAILY FOR SEVERE PAIN      Esomeprazole Magnesium 40 MG Oral Capsule Delayed Release       lisinopril-hydroCHLOROthiazide 10-12.5 MG Oral Tab       fluticasone propionate 50 MCG/ACT Nasal Suspension            ALLERGIES:  No Known Allergies    SOCIAL HISTORY:    Social History     Socioeconomic History    Marital status:    Tobacco Use    Smoking status: Never    Smokeless tobacco: Never   Substance and Sexual Activity    Alcohol use: Yes     Comment: occasionally    Drug use: Never       FAMILY HISTORY:   History reviewed. No pertinent family history.   DM in [place]  Mother with 2 TIAs   Aunt with dementia           PHYSICAL EXAM:   Height: 5' 3\" (160 cm) (05/13 1017)  Weight: 269 lb (122 kg) (05/13 1017)  BSA (Calculated - sq m): 2.19 sq meters (05/13 1017)  Pulse: 100 (05/13 1017)  BP: 99/66 (05/13 1017)  Temp: --  Do Not Use - Resp Rate: --  SpO2: --    Body mass index is 47.65 kg/m².  Obese   Denied Pedal edema   No goiter        CONSTITUTIONAL:  Awake and alert. Age appropriate, good hygiene not in acute distress. Well nourished and well developed. no acute distress   PSYCH:   Orientated to time, place, person & situation, Normal mood and affect, memory intact, normal insight and judgment, cooperative       DATA:     Pertinent data reviewed              Latest Reference Range & Units  02/12/24 15:51 02/12/24 15:52   HEMOGLOBIN A1C 4.3 - 5.6 %  8.9 !   GLUCOSE BLOOD  236    !: Data is abnormal      No results for input(s): \"TSH\", \"T4F\", \"T3F\", \"THYP\" in the last 72 hours.  No results found.          No orders of the defined types were placed in this encounter.    Orders Placed This Encounter    pioglitazone 45 MG Oral Tab     Sig: Take 1 tablet (45 mg total) by mouth daily.     Dispense:  90 tablet     Refill:  0    insulin glargine 100 UNIT/ML Subcutaneous Solution Pen-injector     Sig: Inject 20 Units into the skin daily.     Dispense:  15 mL     Refill:  0    metFORMIN  MG Oral Tablet 24 Hr     Sig: Take 2 tablets (1,000 mg total) by mouth 2 (two) times daily with meals.     Dispense:  360 tablet     Refill:  1    rosuvastatin 20 MG Oral Tab     Sig: Take 1 tablet (20 mg total) by mouth nightly.     Dispense:  90 tablet     Refill:  0    Tirzepatide (MOUNJARO) 7.5 MG/0.5ML Subcutaneous Solution Pen-injector     Sig: Inject 7.5 mg into the skin every 7 days for 28 days.     Dispense:  2 mL     Refill:  0          This is a specialized patient consultation in endocrinology and required comprehensive review of prior records, as well as current evaluation, with time required for consideration of complex endocrine issues and consultation. For this visit, I personally interviewed the patient, and family member if accompanied, performed the pertinent parts of the history and physical examination. ROS included screening for appropriate endocrine conditions.   Today's diagnosis and plan were reviewed in detail with the patient who states understanding and agrees with plan. I discussed with the patient possible diagnosis, differential diagnosis, need for work up , treatment options, alternatives and side effects.     Please see note for details about time spent which includes:   · pre-visit preparation  · reviewing records  · face to face time with the patient   · timely documentation of the  encounter  · ordering medications/tests  · communication with care team  · care coordination    I appreciate the opportunity to be part of your patient's medical care and will keep you, as the referring and primary physicians, informed about the care of your patient, including possible future surgery and pathology findings. Please feel free to contact me should you have any questions.      Rodney Staley MD

## 2024-05-16 ENCOUNTER — APPOINTMENT (OUTPATIENT)
Dept: ENDOCRINOLOGY | Facility: HOSPITAL | Age: 59
End: 2024-05-16
Attending: INTERNAL MEDICINE

## 2024-05-23 ENCOUNTER — APPOINTMENT (OUTPATIENT)
Dept: ENDOCRINOLOGY | Facility: HOSPITAL | Age: 59
End: 2024-05-23
Attending: INTERNAL MEDICINE

## 2024-05-31 ENCOUNTER — OFFICE VISIT (OUTPATIENT)
Dept: PULMONOLOGY | Facility: CLINIC | Age: 59
End: 2024-05-31

## 2024-05-31 ENCOUNTER — HOSPITAL ENCOUNTER (OUTPATIENT)
Dept: GENERAL RADIOLOGY | Facility: HOSPITAL | Age: 59
Discharge: HOME | End: 2024-05-31
Attending: PHYSICIAN ASSISTANT
Payer: COMMERCIAL

## 2024-05-31 VITALS
OXYGEN SATURATION: 93 % | HEIGHT: 64 IN | SYSTOLIC BLOOD PRESSURE: 100 MMHG | BODY MASS INDEX: 45.79 KG/M2 | DIASTOLIC BLOOD PRESSURE: 66 MMHG | HEART RATE: 97 BPM | WEIGHT: 268.19 LBS

## 2024-05-31 DIAGNOSIS — R29.818 SUSPECTED SLEEP APNEA: Primary | ICD-10-CM

## 2024-05-31 DIAGNOSIS — I25.118 ATHEROSCLEROSIS OF CORONARY ARTERY OF NATIVE HEART WITH STABLE ANGINA PECTORIS, UNSPECIFIED VESSEL OR LESION TYPE (HCC): ICD-10-CM

## 2024-05-31 DIAGNOSIS — R06.02 SHORTNESS OF BREATH: ICD-10-CM

## 2024-05-31 PROCEDURE — 3008F BODY MASS INDEX DOCD: CPT | Performed by: PHYSICIAN ASSISTANT

## 2024-05-31 PROCEDURE — 3078F DIAST BP <80 MM HG: CPT | Performed by: PHYSICIAN ASSISTANT

## 2024-05-31 PROCEDURE — 99243 OFF/OP CNSLTJ NEW/EST LOW 30: CPT | Performed by: PHYSICIAN ASSISTANT

## 2024-05-31 PROCEDURE — 71046 X-RAY EXAM CHEST 2 VIEWS: CPT | Performed by: PHYSICIAN ASSISTANT

## 2024-05-31 PROCEDURE — 3074F SYST BP LT 130 MM HG: CPT | Performed by: PHYSICIAN ASSISTANT

## 2024-05-31 NOTE — PROGRESS NOTES
Pulmonary Consult Note    History of Present Illness:  Fredi Romano is a 58 year old male presenting to pulmonary clinic today for suspected sleep disordered breathing, referred by Dr. Draper. The patient describes unrefreshing sleep and daytime fatigue occasionally requiring naps. There is snoring. There is no gasping for air. There is history of witnessed apneic events. He goes to bed at 11 PM, falls asleep promptly, and awakens at 8 AM. There is nocturia x1. There are no AM headaches. There is occasional drowsy driving with long drives. There is no urge to move the limbs, cataplexy, hypnagogic hallucinations, or sleep paralysis. [place] Sleepiness Scale score is 11/24.    Patient also complains of occasional shortness of breath. Unsure if related to stress. This is chronic problem with progressive worsening over last few years although some improvement more recently with weight loss. He has no significant difficulty breathing while walking on flat ground if he paces himself. He is winded with 2 flights of stairs. He has occasional chest discomfort which is not clearly exertional. No pleuritic pain. No cough or wheezing. He reports he had abnormal PFTs many years ago with \"low lung volumes\". 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 He has secondhand smoke exposure from his childhood. No history of venous thromboembolism.    Past Medical History: Type 2 diabetes mellitus, hypertension, hyperlipidemia, seasonal allergic rhinitis    Past Surgical History: Bilateral carpal tunnel release, bilateral knee arthroscopy    Family Medical History: Mother alive with bipolar disorder, schizophrenia, TIA; father  with cancer    Social History: , 2 kids, retired   -Tobacco: Lifelong nonsmoker  -Alcohol: Occasional    Allergies: Patient has no known allergies.     Medications: Pioglitazone, insulin glargine, metformin er, rosuvastatin,  mounjaro, accu-chek fastclix lancets, blood glucose test strips 333, blood gluc meter disp-strips, dexcom g7 sensor, docusate sodium, dexcom g7 sensor, montelukast, esomeprazole magnesium, lisinopril-hydrochlorothiazide, fluticasone propionate    Review of Systems:   Constitutional: +Intentional weight loss of 25 pounds in 1 year.  HEENT: No congestion or postnasal drip.  Cardio: +Occasional chest pain.  Respiratory: See HPI.  GI: No acid reflux.  Extremities: No lower extremity swelling or pain.  Neurologic: No headache.  Psych: No depression.     Physical Exam:  /66   Pulse 97   Ht 5' 4\" (1.626 m)   Wt 268 lb 3.2 oz (121.7 kg)   SpO2 93%   BMI 46.04 kg/m²    Constitutional: Obese. No acute distress.  HEENT: Head NC/AT. PEERL. Crowded oropharynx. Mallampati class 4.  Cardio: Regular rate and rhythm. Normal S1 and S2. No murmurs.   Respiratory: Thorax symmetrical with no labored breathing. Clear to ausculation bilaterally with symmetrical breath sounds. No wheezing, rhonchi, or crackles.   Extremities: No clubbing or cyanosis. No LE edema. No calf tenderness.  Neurologic: A&Ox3. No gross motor deficits.  Skin: Warm, dry.  Lymphatic: No cervical or supraclavicular lymphadenopathy.  Psych: Pleasant affect. Cooperative.    Results:  Labs 3/2024: TSH normal, hemoglobin 15.3    Assessment/Plan:  Suspected BRITNEY - High suspicion for BRITNEY in patient with crowded oropharynx, obesity, snoring, and excessive daytime sleepiness. Discussed risks of untreated BRITNEY including increased risk of cardiovascular and cerebrovascular events.  Plan:   -Polysomnography  -Weight loss  -Avoid alcohol  -Avoid sedating drugs  -Never drive if sleepy  -Timing of follow-up contingent on results of above    Shortness of breath - Question of abnormal PFTs in the past (results not available). No prior chest imaging. Lungs clear. Also has chest discomfort although not clearly associated with dyspnea or exertion.   Plan:  -Chest x-ray PA and  lateral  -PFTs  -Consider cardiology evaluation and/or stress test    Kevin Miramontes PA-C  Pulmonary Medicine  5/31/2024

## 2024-06-06 ENCOUNTER — HOSPITAL ENCOUNTER (OUTPATIENT)
Dept: ENDOCRINOLOGY | Facility: HOSPITAL | Age: 59
Discharge: HOME | End: 2024-06-06
Attending: INTERNAL MEDICINE
Payer: COMMERCIAL

## 2024-06-06 VITALS — BODY MASS INDEX: 46 KG/M2 | WEIGHT: 267.5 LBS

## 2024-06-06 DIAGNOSIS — E11.9 TYPE 2 DIABETES MELLITUS WITHOUT COMPLICATION, WITHOUT LONG-TERM CURRENT USE OF INSULIN (HCC): Primary | ICD-10-CM

## 2024-06-06 NOTE — PROGRESS NOTES
Fredi Glynn  DOB10/30/1965 attended Step 2 Group Diabetes Education Class:    Date: 6/6/2024  Start time: 1300 End time: 1500    Wt:   Wt Readings from Last 1 Encounters:   06/06/24 267 lb 8 oz     Weight change since start of program:  -7.5 lbs    Diabetes Overview:  Pathophysiology, pre-diabetes, A1C results, treatment options for diabetes self-management, types of diabetes, myths and facts, risk factors, benefits of good blood glucose control and psychosocial aspects reviewed.     Assessment: Fredi indicates he is consuming 3 smaller meals and snacks.  He is wearing the Dexcom G7 CGM and states blood sugar values are in target.    Patient has demonstrated inconsistent self-monitoring and dietary tracking.    Healthy Eating:  Reviewed Balanced Plate Method and discussed examples of Balanced Plate Method meal planning.   Reviewed macronutrients (carbohydrate, protein, fat) and their impact on blood glucose levels.  Introduced benefits of lower fat food choices.    Being Active:  Discussed exercise benefits and precautions including its effect on blood glucose levels.    Monitoring:  Benefits and options for glucose monitoring, target BG goals, HgbA1C values.   Emphasized importance and benefit of dietary tracking.    Problem Solving: Prevention, detection and treatment of acute complications:  Discussed signs, symptoms and treatment of hypoglycemia and hyperglycemia.    Medication:  Instructed on classes of Diabetes medications available and additional cardiovascular and renal benefits.    Behavior Change:  Goals set for nutrition and monitoring of blood glucose. Discussed how habits are formed, identifying cues and triggers. Discussed identifying barriers to action. Discussed how dietary tracking benefits weight loss.   Reviewed and updated individual goals and action plan set by patient.     Recommendations:  Implement healthy eating habits with portion control and following Balanced Plate Method.  Monitor  blood glucose as directed.  Attend remaining sessions.  Continue to implement individual goals.    Written materials provided for all areas covered.    Patient verbalized understanding and has no further questions at this time.    Kaylyn George RN

## 2024-06-13 ENCOUNTER — HOSPITAL ENCOUNTER (OUTPATIENT)
Dept: ENDOCRINOLOGY | Facility: HOSPITAL | Age: 59
Discharge: HOME | End: 2024-06-13
Attending: INTERNAL MEDICINE
Payer: COMMERCIAL

## 2024-06-13 VITALS — WEIGHT: 266.19 LBS | BODY MASS INDEX: 46 KG/M2

## 2024-06-13 DIAGNOSIS — E11.9 TYPE 2 DIABETES MELLITUS WITHOUT COMPLICATION, WITHOUT LONG-TERM CURRENT USE OF INSULIN (HCC): Primary | ICD-10-CM

## 2024-06-13 NOTE — PROGRESS NOTES
Fredi Glynn  DOB10/30/1965 attended Step 3 Group Diabetes Education Class:    Date: 6/13/2024  Start time: 1315  End time: 1445    Wt:   Wt Readings from Last 1 Encounters:   06/13/24 266 lb 3.2 oz     Weight change since start of program: 8.8# down    Blood Glucose: Not controlled: Progressing toward goal       Healthy Eating:  Reviewed Basic Diet Guidelines as foundation of diabetes meal planning. Reinforced the balanced plate method. Taught nutrition basics defining carbohydrate, protein, and fat. Taught label reading, including an option to count carbohydrate grams or servings. Provided patient with goals for carbohydrate grams/choices for meals and snacks. Discussed sugar substitutes, ETOH and effect on blood glucose. Wilman's helpful for smart phones, as well as websites for examining carbohydrate levels provided. Reviewed and reinforced macronutrient's, carbohydrate counting and meal planning.    Problem Solving:  Reinforced signs, symptoms, and treatment of hypoglycemia using the Rule of 15.  Discussed impact of different food items and portion sizes on blood glucose levels.  Discussed blood glucose target of 180 mg/dL, if testing 2 hours post meal.    Monitoring:  Reviewed blood glucose records and importance of tracking foods/blood glucose levels.  Reinforced the importance of taking medications as prescribed.     Behavior Change:  Reviewed and updated individual goals and action plan set by patient.   Addressed barriers to tracking foods/blood glucose levels and following guidelines presented/achieving goals, as a group discussion.    Recommendations:  Follow individual meal plan recommended by Educator ([facility]).  Continue implementing individual goals.   Attend remaining sessions.  Begin implementing carbohydrate counting and continue dietary and blood glucose tracking.    Written materials provided for all areas covered.    Patient verbalized understanding and has no further questions currently      Sophie Nguyen, RD

## 2024-06-18 ENCOUNTER — TELEPHONE (OUTPATIENT)
Dept: ENDOCRINOLOGY CLINIC | Facility: CLINIC | Age: 59
End: 2024-06-18

## 2024-06-19 RX ORDER — PEN NEEDLE, DIABETIC 30 GX3/16"
1 NEEDLE, DISPOSABLE MISCELLANEOUS DAILY
Qty: 100 EACH | Refills: 1 | Status: SHIPPED | OUTPATIENT
Start: 2024-06-19

## 2024-06-19 NOTE — TELEPHONE ENCOUNTER
Endocrine Refill protocol for Glucose testing supplies       Protocol Criteria: passed  Appointment with Endocrinology completed in the last 12 months or scheduled in the next 6 months     Verify appointment has been completed or scheduled in the appropriate timeline. If so can send a 90 day supply with 1 refill.        Last completed office visit: 5/13/2024 Rodney Staley MD   Next scheduled Follow up:   Future Appointments   Date Time Provider Department Center   6/20/2024  1:00 PM [facility] DIAB CLASSROOM [facility] DIABETES [facility]   6/20/2024  9:00 PM SCHEDULE BY DATE [facility]   8/1/2024  1:00 PM [facility] DIAB CLASSROOM [facility] DIABETES [facility]   8/5/2024 10:00 AM [facility]   9/5/2024  2:45 PM Freddy Draper MD [address]      
Patient requesting new prescription for 90 days for [place] [employer].  Please call.  Thank you.  
Patient refused

## 2024-06-20 ENCOUNTER — HOSPITAL ENCOUNTER (OUTPATIENT)
Dept: ENDOCRINOLOGY | Facility: HOSPITAL | Age: 59
Discharge: HOME | End: 2024-06-20
Attending: INTERNAL MEDICINE
Payer: COMMERCIAL

## 2024-06-20 ENCOUNTER — OFFICE VISIT (OUTPATIENT)
Dept: SLEEP CENTER | Age: 59
End: 2024-06-20
Attending: PHYSICIAN ASSISTANT

## 2024-06-20 VITALS — BODY MASS INDEX: 46 KG/M2 | WEIGHT: 266.19 LBS

## 2024-06-20 DIAGNOSIS — R29.818 SUSPECTED SLEEP APNEA: Primary | ICD-10-CM

## 2024-06-20 DIAGNOSIS — E11.9 TYPE 2 DIABETES MELLITUS WITHOUT COMPLICATION, WITHOUT LONG-TERM CURRENT USE OF INSULIN (HCC): Primary | ICD-10-CM

## 2024-06-20 PROCEDURE — 95811 POLYSOM 6/>YRS CPAP 4/> PARM: CPT

## 2024-06-20 NOTE — PROGRESS NOTES
Fredi Glynn  DOB10/30/1965 attended Step 4 Group Diabetes Education Class:     Date: 6/20/2024  Start time: 1:30 pm End time: 3:00 pm    Wt:   Wt Readings from Last 1 Encounters:   06/20/24 266 lb 3.2 oz     Weight change since start of program:- 8.8 lbs    Blood Glucose: Not controlled: Progressing toward goal    Reviewed information covered in previous classes including benefits of maintaining good blood glucose control and healthy weight in decreasing diabetes complications.     Prevention, detection, and treatment of chronic complications  Reviewed how to reduce risks of complications including eye, foot, dental, renal, and cardiovascular. Discussed American Diabetes Association guidelines for testing including eye exam, lipid panels, urine protein tests, dental and foot exams. Encouraged to get annual flu shot. Discussed importance of immunizations, vaccinations, and guidelines for sick day management. Discussed wearing medical ID.     Problem Solving  Discussed signs, symptoms, and prevention of DKA and HHNS. Hand out provided.  Discussed disaster preparedness and Diabetes.   Discussed Diabetes medication assistance and supply management.     Healthy Eating  Reviewed and reinforced macronutrients, carbohydrate counting, and meal planning.  Reviewed meal planning methods.   Discussed healthy eating approaches for dining out, holidays and special occasions.  Provided tips on how family members can support healthy changes in diet.      Behavior Change  Reviewed and updated individual goals and action plan set by patient.     Recommendations:  Monitor blood glucose as directed.  Follow individual meal plan and continue dietary tracking.  Attend remaining sessions.  Continue to implement individual goals.      Written materials provided for all areas covered.    Patient verbalized understanding and has no further questions at this time.     Amelia Wallace RN

## 2024-06-27 ENCOUNTER — APPOINTMENT (OUTPATIENT)
Dept: ENDOCRINOLOGY | Facility: HOSPITAL | Age: 59
End: 2024-06-27
Attending: INTERNAL MEDICINE
Payer: COMMERCIAL

## 2024-06-27 ENCOUNTER — HOSPITAL ENCOUNTER (OUTPATIENT)
Dept: ENDOCRINOLOGY | Facility: HOSPITAL | Age: 59
Discharge: HOME | End: 2024-06-27
Attending: INTERNAL MEDICINE
Payer: COMMERCIAL

## 2024-06-27 VITALS — BODY MASS INDEX: 46 KG/M2 | WEIGHT: 266.69 LBS

## 2024-06-27 DIAGNOSIS — E11.9 TYPE 2 DIABETES MELLITUS WITHOUT COMPLICATION, WITHOUT LONG-TERM CURRENT USE OF INSULIN (HCC): Primary | ICD-10-CM

## 2024-06-27 NOTE — PROGRESS NOTES
Fredi KATERINE Renard  DOB10/30/1965 attended Step 5 Group Diabetes Education Class:    Date: 6/27/2024  Start time: 1300  End time: 1500    Wt:   Wt Readings from Last 1 Encounters:   06/27/24 266 lb 11.2 oz     Weight change since start of program: 8.3#    Blood Glucose: Not controlled: Progressing toward goal    Healthy Eating  Reviewed and reinforced macronutrients, carbohydrate counting, and meal planning. Reviewed meal planning methods. Taught principles of heart healthy eating (fats, cholesterol, fiber, and sodium intake).  Discussed Mediterranean meal planning.  Taught label guidelines for choosing fat controlled proteins, snacks and desserts. Discussed how to integrate information for meal planning.     Medication   Guidelines for cholesterol medications for persons with Diabetes.    Healthy Coping  Instructed on developing and implementing a support plan. Discussed avoiding Diabetes burnout, dealing with stress and stress reduction techniques. Discussed recognizing and dealing with depression and diabetes.     Being Active  Reinforced the benefits of exercise and safety precautions. Discussed the effects of blood glucose levels and role in weight loss. Discussed strategies for increasing activity and maintaining regular exercise regimen.    Problem Solving  List of community resources provided and discussed.    Behavior Change  Reviewed and updated individual goals set by patient. Action plan completed and Diabetes support plan initiated    Patient has identified the following goal(s) and actions related to: Being Active  Diabetes goal assessment and action plan scanned into Media  Fredi Glynn has chosen the following ongoing Diabetes Support Plan: The use of Diabetes Websites or Apps    Written materials provided for all areas covered.    Patient verbalized understanding and has no further questions at this time.     Sophie Nguyen RD

## 2024-07-29 DIAGNOSIS — E11.65 HYPERGLYCEMIA DUE TO DIABETES MELLITUS (HCC): ICD-10-CM

## 2024-07-29 DIAGNOSIS — E11.65 UNCONTROLLED TYPE 2 DIABETES MELLITUS WITH HYPERGLYCEMIA (HCC): ICD-10-CM

## 2024-07-29 DIAGNOSIS — E66.01 CLASS 3 SEVERE OBESITY WITH BODY MASS INDEX (BMI) OF 45.0 TO 49.9 IN ADULT, UNSPECIFIED OBESITY TYPE, UNSPECIFIED WHETHER SERIOUS COMORBIDITY PRESENT (HCC): ICD-10-CM

## 2024-07-29 DIAGNOSIS — R73.09 HIGH GLUCOSE LEVEL: ICD-10-CM

## 2024-07-29 DIAGNOSIS — I10 HTN (HYPERTENSION), BENIGN: ICD-10-CM

## 2024-07-29 DIAGNOSIS — E78.5 DYSLIPIDEMIA: ICD-10-CM

## 2024-07-29 DIAGNOSIS — E11.9 TYPE 2 DIABETES MELLITUS WITHOUT COMPLICATION, WITHOUT LONG-TERM CURRENT USE OF INSULIN (HCC): ICD-10-CM

## 2024-07-29 RX ORDER — ACYCLOVIR 400 MG/1
1 TABLET ORAL
Qty: 9 EACH | Refills: 1 | Status: SHIPPED | OUTPATIENT
Start: 2024-07-29 | End: 2025-01-25

## 2024-07-29 NOTE — TELEPHONE ENCOUNTER
Patient called to request a refill for the DEXCOM G7 SENSOR. Please send the sensors to the [facility] pharmacy.         Continuous Blood Gluc Sensor (DEXCOM G7 SENSOR) Does not apply Misc, 1 each Every 10 days. Use as directed every 10 days, Disp: 9 each, Rfl: 1    Continuous Blood Gluc Sensor (DEXCOM G7 SENSOR) Does not apply Misc, 1 each Every 10 days. Use as directed every 10 days, Disp: 9 each, Rfl: 1

## 2024-07-29 NOTE — TELEPHONE ENCOUNTER
Endocrine Refill protocol for CGM supplies     Protocol Criteria:PASSED    Appointment with Endocrinology completed in the last 12 months or scheduled in the next 6 months     Verify appointment has been completed or scheduled in the appropriate timeline. If so can send a 90 day supply with 1 refill.     Last completed office visit:5/13/2024 Rodney Staley MD   Next scheduled Follow up:   Future Appointments   Date Time Provider Department Center   9/5/2024  2:45 PM [id number]   9/16/2024  2:30 PM [facility]    90 day + 1 refill pending

## 2024-08-01 ENCOUNTER — APPOINTMENT (OUTPATIENT)
Dept: ENDOCRINOLOGY | Facility: HOSPITAL | Age: 59
End: 2024-08-01
Attending: INTERNAL MEDICINE

## 2024-08-20 ENCOUNTER — OFFICE VISIT (OUTPATIENT)
Facility: LOCATION | Age: 59
End: 2024-08-20
Payer: COMMERCIAL

## 2024-08-20 VITALS — BODY MASS INDEX: 45 KG/M2 | WEIGHT: 264.19 LBS

## 2024-08-20 DIAGNOSIS — E66.01 CLASS 3 SEVERE OBESITY WITH BODY MASS INDEX (BMI) OF 45.0 TO 49.9 IN ADULT, UNSPECIFIED OBESITY TYPE, UNSPECIFIED WHETHER SERIOUS COMORBIDITY PRESENT (HCC): ICD-10-CM

## 2024-08-20 DIAGNOSIS — E78.5 DYSLIPIDEMIA: ICD-10-CM

## 2024-08-20 DIAGNOSIS — E11.649 HYPOGLYCEMIA ASSOCIATED WITH DIABETES (HCC): ICD-10-CM

## 2024-08-20 DIAGNOSIS — E11.9 TYPE 2 DIABETES MELLITUS WITHOUT COMPLICATION, WITHOUT LONG-TERM CURRENT USE OF INSULIN (HCC): ICD-10-CM

## 2024-08-20 DIAGNOSIS — E11.65 HYPERGLYCEMIA DUE TO DIABETES MELLITUS (HCC): ICD-10-CM

## 2024-08-20 DIAGNOSIS — I10 HTN (HYPERTENSION), BENIGN: ICD-10-CM

## 2024-08-20 DIAGNOSIS — R73.09 HIGH GLUCOSE LEVEL: ICD-10-CM

## 2024-08-20 DIAGNOSIS — E11.65 UNCONTROLLED TYPE 2 DIABETES MELLITUS WITH HYPERGLYCEMIA (HCC): Primary | ICD-10-CM

## 2024-08-20 LAB — HEMOGLOBIN A1C: 7 % (ref 4.3–5.6)

## 2024-08-20 PROCEDURE — 99214 OFFICE O/P EST MOD 30 MIN: CPT | Performed by: INTERNAL MEDICINE

## 2024-08-20 PROCEDURE — 83036 HEMOGLOBIN GLYCOSYLATED A1C: CPT | Performed by: INTERNAL MEDICINE

## 2024-08-20 PROCEDURE — 3052F HG A1C>EQUAL 8.0%<EQUAL 9.0%: CPT | Performed by: INTERNAL MEDICINE

## 2024-08-20 RX ORDER — PIOGLITAZONEHYDROCHLORIDE 45 MG/1
45 TABLET ORAL DAILY
Qty: 90 TABLET | Refills: 0 | Status: SHIPPED | OUTPATIENT
Start: 2024-08-20

## 2024-08-20 RX ORDER — PEN NEEDLE, DIABETIC 30 GX3/16"
1 NEEDLE, DISPOSABLE MISCELLANEOUS DAILY
Qty: 100 EACH | Refills: 1 | Status: SHIPPED | OUTPATIENT
Start: 2024-08-20 | End: 2024-08-20

## 2024-08-20 RX ORDER — TIRZEPATIDE 10 MG/.5ML
10 INJECTION, SOLUTION SUBCUTANEOUS
Qty: 6 ML | Refills: 0 | Status: SHIPPED | OUTPATIENT
Start: 2024-08-20 | End: 2024-11-12

## 2024-08-20 RX ORDER — METFORMIN HCL 500 MG
1000 TABLET, EXTENDED RELEASE 24 HR ORAL 2 TIMES DAILY WITH MEALS
Qty: 360 TABLET | Refills: 1 | Status: SHIPPED | OUTPATIENT
Start: 2024-08-20 | End: 2025-02-16

## 2024-08-20 RX ORDER — ROSUVASTATIN CALCIUM 20 MG/1
20 TABLET, COATED ORAL NIGHTLY
Qty: 90 TABLET | Refills: 0 | Status: SHIPPED | OUTPATIENT
Start: 2024-08-20 | End: 2024-11-18

## 2024-08-20 RX ORDER — BLOOD SUGAR DIAGNOSTIC
1 STRIP MISCELLANEOUS DAILY
Qty: 100 EACH | Refills: 2 | Status: SHIPPED | OUTPATIENT
Start: 2024-08-20 | End: 2024-11-18

## 2024-08-20 NOTE — PATIENT INSTRUCTIONS
Lab today   Had  sleep study  Likes G7 dexcom and will continue     Follow up with podiatry and eye doctor annually.     Metformin  1000 mg at night and 500 mg in the morning for a week, then increase to 1000 mg twice day. If getting diarrhea, wait and do not increase the dose till the diarrhea resolves.      Every month increase  Mounjaro 7.5->10 mg weekly    Pioglitzone 45 -.>30- might cause leg swelling so will consider 30 mg  Jardiance 25 mg   Basaglar  20 units   Crestor  20 mg/day      Target BG   BEFORE MEAL 100-130mg/dl  2hrs AFTER MEAL less than 180mg/dl    Check blood sugar fasting, before meals and before bedtime.   If you have low blood sugar <70, take 15 grams of carb (8 oz juice or regular soda) and recheck in 15 minutes.      Patients need to wear covered shoes all the time and check feet daily.   Bring your meter/BG log to the next visit.

## 2024-08-20 NOTE — PROGRESS NOTES
follow up Visit:  DM.    Requesting Physician:   SARBJIT CHAUHAN      CHIEF COMPLAINT:    Chief Complaint   Patient presents with    Diabetes     Follow-up and A1c check        HISTORY OF PRESENT ILLNESS:   Fredi Romano is a 58 year old male who presents with uncontrolled DM, DLP, obese      Worried about DM complications - foot ulcers, amputation,   Will see cardiology and will follow up with sleep medicine     Seeing Dr Bonny Castillo with T2DM  >5 yrs ago  Lowest A1c 7, but was up to 11 before       Drove from [place] and gained some wt   Before that was losing wt   Happy with BG readings     On   Metformin 1000 mg once a day    Pioglitazone 45 mg /day   Jardiance 25 mg /day  Basaglar 20 units    mounjaro  7.5 mg /week    Ozempic 0.25 - had GI SE from 1 mg    Stopped WRIGHT when started insulin         Retired . Lives between [place] and [place].   Mother lives at home and has stress at home   he is more active in [place]     DM quality measures:  A1C/Blood pressure: as reported above   Nephropathy screenin/2023 , not on ace /arb rx.   LIPID screenin/2023 .   On statin rx.   Last dilated eye exam: No data recorded 2024  Exam shows retinopathy? No data recorded no  Last diabetic foot exam: No data recorded  Dentist : recommend every 6m  Neuropathy:  yes in hands but can be from carpel tunnel   CAD/ASCVD/PAD/CVA: no        Lab Results   Component Value Date    A1C 8.0 (A) 2024    A1C 8.9 (A) 2024      Wt Readings from Last 6 Encounters:   24 264 lb 3.2 oz (119.8 kg)   24 266 lb 11.2 oz (121 kg)   24 266 lb 3.2 oz (120.7 kg)   24 266 lb 3.2 oz (120.7 kg)   24 267 lb 8 oz (121.3 kg)   24 268 lb 3.2 oz (121.7 kg)         A/P  58 year old man with complicated t2DM, hyperglycemia, obese , DLP, hypoglycemia       Reviewed CGM data  Reviewed labs   Will lower insulin later   No edema today on exam   UTD on foot exam and eye exam     Target A1c 6.5%  Target BG    BEFORE MEAL 100-130mg/dl  2hrs AFTER MEAL less than 180mg/dl    Plan  Lab today   Had  sleep study  Likes G7 dexcom and will continue     Follow up with podiatry and eye doctor annually.     Metformin  1000 mg at night and 500 mg in the morning for a week, then increase to 1000 mg twice day. If getting diarrhea, wait and do not increase the dose till the diarrhea resolves.      Every month increase  Mounjaro 7.5->10 mg weekly    Pioglitzone 45 -.>30- might cause leg swelling so will consider 30 mg  Jardiance 25 mg   Basaglar  20 units   Crestor  20 mg/day      Target BG   BEFORE MEAL 100-130mg/dl  2hrs AFTER MEAL less than 180mg/dl    Check blood sugar fasting, before meals and before bedtime.   If you have low blood sugar <70, take 15 grams of carb (8 oz juice or regular soda) and recheck in 15 minutes.      Patients need to wear covered shoes all the time and check feet daily.   Bring your meter/BG log to the next visit.         PAST MEDICAL HISTORY:   Past Medical History:    DM2 (diabetes mellitus, type 2) ([facility])    Dyslipidemia    Morbid obesity with BMI of 45.0-49.9, adult ([facility])      PAST SURGICAL HISTORY:   Past Surgical History:   Procedure Laterality Date    Carpal tunnel release Bilateral     Knee arthroscopy Bilateral      Knee surgery   Carpel tunnel      CURRENT MEDICATIONS:     rosuvastatin 20 MG Oral Tab Take 1 tablet (20 mg total) by mouth nightly. 90 tablet 0    pioglitazone 45 MG Oral Tab Take 1 tablet (45 mg total) by mouth daily. 90 tablet 0    metFORMIN  MG Oral Tablet 24 Hr Take 2 tablets (1,000 mg total) by mouth 2 (two) times daily with meals. 360 tablet 1    insulin glargine 100 UNIT/ML Subcutaneous Solution Pen-injector Inject 20 Units into the skin daily. 15 mL 0    Tirzepatide (MOUNJARO) 10 MG/0.5ML Subcutaneous Solution Pen-injector Inject 10 mg into the skin every 7 days. 6 mL 0    Insulin Pen Needle (PEN NEEDLES) 32G X 6 MM Does not apply Misc 1 each daily. 100 each 2        ALLERGIES:  No Known Allergies    SOCIAL HISTORY:    Social History     Socioeconomic History    Marital status:    Tobacco Use    Smoking status: Never    Smokeless tobacco: Never   Vaping Use    Vaping status: Never Used   Substance and Sexual Activity    Alcohol use: Yes     Comment: occasionally    Drug use: Never       FAMILY HISTORY:   Family History   Problem Relation Age of Onset    Other (Osteoarthritis) Mother     Other (Cognitive impairment) Mother     Bipolar Disorder Mother     Schizophrenia Mother     Other (TIA) Mother     Cancer Father         Throat      DM in [place]  Mother with 2 TIAs   Aunt with dementia           PHYSICAL EXAM:   Height: --  Weight: 264 lb 3.2 oz (119.8 kg) (08/20 1146)  BSA (Calculated - sq m): --  Pulse: --  BP: --  Temp: --  Do Not Use - Resp Rate: --  SpO2: --    Body mass index is 45.35 kg/m².  Obese   Denied Pedal edema   No goiter        CONSTITUTIONAL:  Awake and alert. Age appropriate, good hygiene not in acute distress. Well nourished and well developed. no acute distress   PSYCH:   Orientated to time, place, person & situation, Normal mood and affect, memory intact, normal insight and judgment, cooperative       DATA:     Pertinent data reviewed        3/13/24 10:40 AM    TSH  0.30 - 5.33 µIU/mL 0.96     3/13/24 10:40 AM    Total Cholesterol  0 - 199 mg/dL 152   Triglycerides  0.00 - 150.00 mg/dL 156 High        HDL Cholesterol  >40 mg/dL 41   LDL Cholesterol  0 - 99 mg/dL 86         Latest Reference Range & Units 02/12/24 15:51 02/12/24 15:52   HEMOGLOBIN A1C 4.3 - 5.6 %  8.9 !   GLUCOSE BLOOD  236    !: Data is abnormal      No results for input(s): \"TSH\", \"T4F\", \"T3F\", \"THYP\" in the last 72 hours.  No results found.          Orders Placed This Encounter   Procedures    Microalb/Creat Ratio, Random Urine     Orders Placed This Encounter    Microalb/Creat Ratio, Random Urine     Standing Status:   Future     Standing Expiration Date:   8/20/2025     Order  Specific Question:   Release to patient     Answer:   Immediate    rosuvastatin 20 MG Oral Tab     Sig: Take 1 tablet (20 mg total) by mouth nightly.     Dispense:  90 tablet     Refill:  0    pioglitazone 45 MG Oral Tab     Sig: Take 1 tablet (45 mg total) by mouth daily.     Dispense:  90 tablet     Refill:  0    metFORMIN  MG Oral Tablet 24 Hr     Sig: Take 2 tablets (1,000 mg total) by mouth 2 (two) times daily with meals.     Dispense:  360 tablet     Refill:  1    insulin glargine 100 UNIT/ML Subcutaneous Solution Pen-injector     Sig: Inject 20 Units into the skin daily.     Dispense:  15 mL     Refill:  0    DISCONTD: Insulin Pen Needle (PEN NEEDLES) 32G X 4 MM Does not apply Misc     Si each daily.     Dispense:  100 each     Refill:  1     Ok to dispense whatever brand is preferred by pharmacy.    Tirzepatide (MOUNJARO) 10 MG/0.5ML Subcutaneous Solution Pen-injector     Sig: Inject 10 mg into the skin every 7 days.     Dispense:  6 mL     Refill:  0    Insulin Pen Needle (PEN NEEDLES) 32G X 6 MM Does not apply Misc     Si each daily.     Dispense:  100 each     Refill:  2          This is a specialized patient consultation in endocrinology and required comprehensive review of prior records, as well as current evaluation, with time required for consideration of complex endocrine issues and consultation. For this visit, I personally interviewed the patient, and family member if accompanied, performed the pertinent parts of the history and physical examination. ROS included screening for appropriate endocrine conditions.   Today's diagnosis and plan were reviewed in detail with the patient who states understanding and agrees with plan. I discussed with the patient possible diagnosis, differential diagnosis, need for work up , treatment options, alternatives and side effects.     Please see note for details about time spent which includes:   · pre-visit preparation  · reviewing records  · face to  face time with the patient   · timely documentation of the encounter  · ordering medications/tests  · communication with care team  · care coordination    I appreciate the opportunity to be part of your patient's medical care and will keep you, as the referring and primary physicians, informed about the care of your patient, including possible future surgery and pathology findings. Please feel free to contact me should you have any questions.      Rodney Staley MD

## 2024-09-05 ENCOUNTER — OFFICE VISIT (OUTPATIENT)
Dept: SURGERY | Facility: CLINIC | Age: 59
End: 2024-09-05
Payer: COMMERCIAL

## 2024-09-05 VITALS
OXYGEN SATURATION: 95 % | SYSTOLIC BLOOD PRESSURE: 110 MMHG | HEIGHT: 63 IN | WEIGHT: 258 LBS | HEART RATE: 112 BPM | DIASTOLIC BLOOD PRESSURE: 80 MMHG | BODY MASS INDEX: 45.71 KG/M2

## 2024-09-05 DIAGNOSIS — E11.9 TYPE 2 DIABETES MELLITUS WITHOUT COMPLICATION, WITHOUT LONG-TERM CURRENT USE OF INSULIN (HCC): Primary | ICD-10-CM

## 2024-09-05 DIAGNOSIS — I10 HTN (HYPERTENSION), BENIGN: ICD-10-CM

## 2024-09-05 DIAGNOSIS — E78.5 DYSLIPIDEMIA: ICD-10-CM

## 2024-09-05 PROCEDURE — 3008F BODY MASS INDEX DOCD: CPT | Performed by: INTERNAL MEDICINE

## 2024-09-05 PROCEDURE — 3079F DIAST BP 80-89 MM HG: CPT | Performed by: INTERNAL MEDICINE

## 2024-09-05 PROCEDURE — 99214 OFFICE O/P EST MOD 30 MIN: CPT | Performed by: INTERNAL MEDICINE

## 2024-09-05 PROCEDURE — 3074F SYST BP LT 130 MM HG: CPT | Performed by: INTERNAL MEDICINE

## 2024-09-05 NOTE — PROGRESS NOTES
[facility], [facility], [place]  [address]  [facility] [address]  Dept: [phone]       Patient:  Fredi Glynn  :      10/30/1965  MRN:      [id number]    Chief Complaint:    Chief Complaint   Patient presents with    Follow - Up    Weight Management       SUBJECTIVE     History of Present Illness:  Fredi is being seen today for a follow-up for non surgical weight loss.     Past Medical History:   Past Medical History:    DM2 (diabetes mellitus, type 2) ([facility])    Dyslipidemia    Morbid obesity with BMI of 45.0-49.9, adult ([facility])        Comorbidities:  Joint pain-Improvement?  yes, GERD-Improvement?  yes, Diabetes-Improvement?  yes, ELÍAS-Improvement?  yes, Snoring-Improvement?  yes and Sleep apnea-Improvement?  yes    OBJECTIVE     Vitals: /80 (BP Location: Right arm, Patient Position: Sitting, Cuff Size: adult)   Pulse 112   Ht 5' 3\" (1.6 m)   Wt 258 lb (117 kg)   SpO2 95%   BMI 45.70 kg/m²     Initial weight loss: -14   Total weight loss: -23   Start weight: 281    Wt Readings from Last 3 Encounters:   24 258 lb (117 kg)   24 264 lb 3.2 oz (119.8 kg)   24 266 lb 11.2 oz (121 kg)       Patient Medications:    Current Outpatient Medications   Medication Sig Dispense Refill    rosuvastatin 20 MG Oral Tab Take 1 tablet (20 mg total) by mouth nightly. 90 tablet 0    pioglitazone 45 MG Oral Tab Take 1 tablet (45 mg total) by mouth daily. 90 tablet 0    metFORMIN  MG Oral Tablet 24 Hr Take 2 tablets (1,000 mg total) by mouth 2 (two) times daily with meals. 360 tablet 1    insulin glargine 100 UNIT/ML Subcutaneous Solution Pen-injector Inject 20 Units into the skin daily. 15 mL 0    Tirzepatide (MOUNJARO) 10 MG/0.5ML Subcutaneous Solution Pen-injector Inject 10 mg into the skin every 7 days. 6 mL 0    Insulin Pen Needle (PEN NEEDLES) 32G X 6 MM Does not apply Misc 1 each daily. 100 each 2    Continuous Glucose Sensor  (DEXCOM G7 SENSOR) Does not apply Misc 1 each Every 10 days. Use as directed every 10 days 9 each 1    Accu-Chek FastClix Lancets Does not apply Misc Use to check blood sugar three times a day 300 each 1    Blood Gluc Meter Disp-Strips Does not apply Device Check BG 3x/day 1 each 0    Continuous Blood Gluc Sensor (DEXCOM G7 SENSOR) Does not apply Misc 1 each Every 10 days. Use as directed every 10 days 9 each 1    montelukast 10 MG Oral Tab Take 1 tablet (10 mg total) by mouth daily.      ibuprofen 800 MG Oral Tab TAKE ONE TABLET BY MOUTH THREE TIMES DAILY FOR SEVERE PAIN (Patient not taking: Reported on 5/31/2024)      Esomeprazole Magnesium 40 MG Oral Capsule Delayed Release       lisinopril-hydroCHLOROthiazide 10-12.5 MG Oral Tab       fluticasone propionate 50 MCG/ACT Nasal Suspension        Allergies:  Patient has no known allergies.     Social History:    Social History     Socioeconomic History    Marital status:      Spouse name: Not on file    Number of children: Not on file    Years of education: Not on file    Highest education level: Not on file   Occupational History    Not on file   Tobacco Use    Smoking status: Never    Smokeless tobacco: Never   Vaping Use    Vaping status: Never Used   Substance and Sexual Activity    Alcohol use: Yes     Comment: occasionally    Drug use: Never    Sexual activity: Not on file   Other Topics Concern    Not on file   Social History Narrative    Not on file     Social Determinants of Health     Financial Resource Strain: Not on file   Food Insecurity: Not on file   Transportation Needs: Not on file   Physical Activity: Not on file   Stress: Not on file   Social Connections: Unknown (3/13/2021)    [facility]    Social Connections     Conversations with friends/family/neighbors per week: Not on file   Housing Stability: Low Risk  (7/17/2021)    Received from [facility]  [facility]    Housing Stability     Mortgage Payment Concerns?: Not on file     Number of Places Lived in the Last Year: Not on file     Unstable Housing?: Not on file     Surgical History:    Past Surgical History:   Procedure Laterality Date    Carpal tunnel release Bilateral     Knee arthroscopy Bilateral      Family History:    Family History   Problem Relation Age of Onset    Other (Osteoarthritis) Mother     Other (Cognitive impairment) Mother     Bipolar Disorder Mother     Schizophrenia Mother     Other (TIA) Mother     Cancer Father         Throat       Food Journal  Reviewed and Discussed:       Patient has a Food Journal?: yes   Patient is reading nutrition labels?  yes  Average Caloric Intake:     Average CHO Intake: 120  Is patient exercising? yes  Type of exercise? Continue going to the gym    Eating Habits  Patient states the following:  Eats 3 meal(s) per day  Length of time it takes to consume a meal:  20  # of snacks per day: 1 Type of snacks:  Meat and cheese  Amount of soda consumption per day:  diet  Amount of water (in ounces) per day:  inad  Drinking between meals only:  yes  Toughest challenge:  Exercise     Nutritional Goals  Limit carbohydrates to 100 gms per day, Eat 100-200 calories within 1 hour of waking  and Eat 3-4 cups of fresh fruits or vegetables daily    Behavior Modifications Reviewed and Discussed  Eat breakfast, Eat 3 meals per day, Plan meals in advance, Read nutrition labels, Drink 64 oz of water per day, Maintain a daily food journal, No drinking 30 minutes before or after meals, Utlize portion control strategies to reduce calorie intake, Identify triggers for eating and manage cues and Eat slowly and take 20 to 30 minutes to complete each meal    Exercise Goals Reviewed and Discussed    Continue with increase activity levels    ROS:    Constitutional: positive for fatigue  Respiratory: positive for dyspnea on exertion  Cardiovascular:  negative  Gastrointestinal: negative  Musculoskeletal:positive for arthralgias and back pain  Neurological: negative  Behavioral/Psych: negative  Endocrine: negative  All other systems were reviewed and are negative    Physical Exam:   General appearance: alert, appears stated age, cooperative and morbidly obese  Head: Normocephalic, without obvious abnormality, atraumatic  Lungs: clear to auscultation bilaterally  Heart: S1, S2 normal, no murmur, click, rub or gallop, regular rate and rhythm  Abdomen:  firm, obese, non tender  Extremities: extremities normal, atraumatic, no cyanosis or edema  Pulses: 2+ and symmetric  Skin: Skin color, texture, turgor normal. No rashes or lesions  Neurologic: Grossly normal    ASSESSMENT     DIABETES:  The patient denies any episodes of hypoglycemia since his last clinic visit.  he denies any lower extremity skin breakdown or foot ulcers.    HYPERTENSION:  The patient's blood pressure has been well controlled.  he has been checking it as instructed and has remained in relatively good control.    HYPERCHOLESTEROLEMIA:  The patient states that his cholesterol has been well controlled on his current medication.    No results found for: \"CHOLEST\", \"LDL\", \"HDL\", \"TRIG\", \"VLDL\", \"TCHDLRATIO\"    Encounter Diagnosis(ses):   Encounter Diagnoses   Name Primary?    Type 2 diabetes mellitus without complication, without long-term current use of insulin (HCC) Yes    HTN (hypertension), benign     Dyslipidemia     Body mass index (BMI) 45.0-49.9, adult (HCC)        PLAN   Given the patient's age, degree of obesity and multiple medical problems outlined above, I do believe that bariatric surgery may prove beneficial if the patient is unable to lose at least 20% of his weight after 6 months time. Further consideration for bariatric surgery will be discussed at upcoming clinic visits.        HYPERTENSION: Blood pressure stable on the above medications. No interval change in antihypertensive  medication.     DIABETES: Continue current medications.    DYSLIPIDEMIA: Stable on the above prescribed meal plan and medication. Liver function stable.    No results found for: \"CHOLEST\", \"LDL\", \"HDL\", \"TRIG\", \"VLDL\", \"TCHDLRATIO\"    OBSTRUCTIVE SLEEP APNEA: Given the patient's history suggestive of obstructive sleep apnea as outlined above, consideration for obtaining a sleep study may be warranted.      Goals for next month:  1. Keep a food log.  2. Drink 48-64 ounces of non-caloric beverages per day. No fruit juices or regular soda.  3. Increase activity-upper body exercises, walk 10 minutes per day.  4. Increase fruit and vegetable servings to 5-6 per day.      Attended seminar    Wants to continue medical management for now     Mounjaro per Endo team    Should follow up with PCP as scheduled    Should get sleep study done    Diagnoses and all orders for this visit:    Type 2 diabetes mellitus without complication, without long-term current use of insulin (HCC)    HTN (hypertension), benign    Dyslipidemia    Body mass index (BMI) 45.0-49.9, adult (HCC)          Freddy Draper MD

## 2024-09-10 ENCOUNTER — TELEPHONE (OUTPATIENT)
Dept: SURGERY | Facility: CLINIC | Age: 59
End: 2024-09-10

## 2024-10-11 ENCOUNTER — HOSPITAL ENCOUNTER (OUTPATIENT)
Dept: CT IMAGING | Facility: HOSPITAL | Age: 59
Discharge: HOME | End: 2024-10-11
Attending: INTERNAL MEDICINE
Payer: COMMERCIAL

## 2024-10-11 DIAGNOSIS — R93.89 ABNORMAL CAROTID ULTRASOUND: ICD-10-CM

## 2024-10-11 PROCEDURE — 70498 CT ANGIOGRAPHY NECK: CPT | Performed by: INTERNAL MEDICINE

## 2024-10-11 PROCEDURE — 82565 ASSAY OF CREATININE: CPT

## 2024-10-18 LAB
CREAT BLD-MCNC: 1 MG/DL
EGFRCR SERPLBLD CKD-EPI 2021: 88 ML/MIN/1.73M2 (ref 60–?)

## 2024-11-27 ENCOUNTER — TELEMEDICINE (OUTPATIENT)
Dept: ENDOCRINOLOGY CLINIC | Facility: CLINIC | Age: 59
End: 2024-11-27

## 2024-11-27 ENCOUNTER — TELEPHONE (OUTPATIENT)
Dept: ENDOCRINOLOGY CLINIC | Facility: CLINIC | Age: 59
End: 2024-11-27

## 2024-11-27 DIAGNOSIS — E66.01 CLASS 3 SEVERE OBESITY WITH BODY MASS INDEX (BMI) OF 45.0 TO 49.9 IN ADULT, UNSPECIFIED OBESITY TYPE, UNSPECIFIED WHETHER SERIOUS COMORBIDITY PRESENT (HCC): ICD-10-CM

## 2024-11-27 DIAGNOSIS — E11.9 TYPE 2 DIABETES MELLITUS WITHOUT COMPLICATION, WITHOUT LONG-TERM CURRENT USE OF INSULIN (HCC): ICD-10-CM

## 2024-11-27 DIAGNOSIS — I10 HTN (HYPERTENSION), BENIGN: ICD-10-CM

## 2024-11-27 DIAGNOSIS — E66.813 CLASS 3 SEVERE OBESITY WITH BODY MASS INDEX (BMI) OF 45.0 TO 49.9 IN ADULT, UNSPECIFIED OBESITY TYPE, UNSPECIFIED WHETHER SERIOUS COMORBIDITY PRESENT (HCC): ICD-10-CM

## 2024-11-27 DIAGNOSIS — E11.65 UNCONTROLLED TYPE 2 DIABETES MELLITUS WITH HYPERGLYCEMIA (HCC): Primary | ICD-10-CM

## 2024-11-27 DIAGNOSIS — E11.649 HYPOGLYCEMIA ASSOCIATED WITH DIABETES (HCC): ICD-10-CM

## 2024-11-27 DIAGNOSIS — R73.09 HIGH GLUCOSE LEVEL: ICD-10-CM

## 2024-11-27 DIAGNOSIS — E78.5 DYSLIPIDEMIA: ICD-10-CM

## 2024-11-27 DIAGNOSIS — E11.65 HYPERGLYCEMIA DUE TO DIABETES MELLITUS (HCC): ICD-10-CM

## 2024-11-27 PROCEDURE — 99214 OFFICE O/P EST MOD 30 MIN: CPT | Performed by: INTERNAL MEDICINE

## 2024-11-27 RX ORDER — ACYCLOVIR 400 MG/1
1 TABLET ORAL
Qty: 9 EACH | Refills: 1 | Status: SHIPPED | OUTPATIENT
Start: 2024-11-27 | End: 2025-05-26

## 2024-11-27 RX ORDER — BLOOD SUGAR DIAGNOSTIC
1 STRIP MISCELLANEOUS 4 TIMES DAILY
Qty: 100 EACH | Refills: 2 | Status: SHIPPED | OUTPATIENT
Start: 2024-11-27 | End: 2025-02-25

## 2024-11-27 RX ORDER — TIRZEPATIDE 12.5 MG/.5ML
12.5 INJECTION, SOLUTION SUBCUTANEOUS
Qty: 6 ML | Refills: 0 | Status: SHIPPED | OUTPATIENT
Start: 2024-11-27

## 2024-11-27 RX ORDER — LANCETS 33 GAUGE
1 EACH MISCELLANEOUS DAILY
Qty: 100 EACH | Refills: 1 | Status: SHIPPED | OUTPATIENT
Start: 2024-11-27

## 2024-11-27 RX ORDER — METFORMIN HYDROCHLORIDE 500 MG/1
1000 TABLET, EXTENDED RELEASE ORAL 2 TIMES DAILY WITH MEALS
Qty: 360 TABLET | Refills: 1 | Status: SHIPPED | OUTPATIENT
Start: 2024-11-27 | End: 2025-05-26

## 2024-11-27 RX ORDER — ACYCLOVIR 400 MG/1
1 TABLET ORAL ONCE
Qty: 1 EACH | Refills: 0 | Status: SHIPPED | OUTPATIENT
Start: 2024-11-27 | End: 2024-11-27

## 2024-11-27 RX ORDER — PIOGLITAZONE 45 MG/1
45 TABLET ORAL DAILY
Qty: 90 TABLET | Refills: 0 | Status: SHIPPED | OUTPATIENT
Start: 2024-11-27

## 2024-11-27 RX ORDER — BLOOD SUGAR DIAGNOSTIC
1 STRIP MISCELLANEOUS 3 TIMES DAILY
Qty: 300 STRIP | Refills: 1 | Status: SHIPPED | OUTPATIENT
Start: 2024-11-27 | End: 2025-02-25

## 2024-11-27 NOTE — TELEPHONE ENCOUNTER
Prior Authorization    KEY:  [id number]  Patient last name:  KATERINE SANCHEZ:  [birth year]  Current Outpatient Medications   Medication Sig Dispense Refill    Continuous Glucose Sensor (DEXCOM G7 SENSOR) Does not apply Misc 1 each Every 10 days. Use as directed every 10 days 9 each 1                                                                                                                   CLEMENTS:  [id number]  Patient last name:  KATERINE SANCHEZ:  10-    Current Outpatient Medications   Medication Sig Dispense Refill                                                     Continuous Glucose  (DEXCOM G7 ) Does not apply Device 1 each one time for 1 dose. 1 each 0

## 2024-11-27 NOTE — PROGRESS NOTES
follow up Visit:  DM.    Requesting Physician:   SARBJIT CHAUHAN      CHIEF COMPLAINT:    No chief complaint on file.       HISTORY OF PRESENT ILLNESS:   Fredi Romano is a 59 year old male who presents with controlled DM, DLP, obese      Had sleep study   Happy with labs and wt loss   Happy with BG readings   Wt is 250 lb now   A1c 6.2% 11/21/2024    Denied low BG  Lowering insulin doses   We increased mounjaro and he reports GI sx in the beginning but better now.       Will go to [place] soon. he is more active in [place].      Seeing Dr Bonny Castillo with T2DM  >5 yrs ago      On   Metformin 1000 mg once a day    Pioglitazone 45 mg /day   Jardiance 25 mg /day  Basaglar 16-18 units    mounjaro  10 mg /week    Ozempic 0.25 - had GI SE from 1 mg    Stopped WRIGHT when started insulin         Retired . Lives between [place] and [place].     DM quality measures:  A1C/Blood pressure: as reported above     Last dilated eye exam: No data recorded 6/2024  Exam shows retinopathy? No data recorded no  Last diabetic foot exam: No data recorded  Dentist : recommend every 6m  Neuropathy:  yes in hands but can be from carpel tunnel   CAD/ASCVD/PAD/CVA: no  Cholesterol Meds:  crestor   No Lipid results on file in last 5 years .    TSH 0.95,  LDL 66 11/2024   MALB 23 6/203 was high before 67 in 1/2023    Lab Results   Component Value Date    A1C 7.0 (A) 08/20/2024    A1C 8.0 (A) 05/13/2024    A1C 8.9 (A) 02/12/2024      Wt Readings from Last 6 Encounters:   09/05/24 258 lb (117 kg)   08/20/24 264 lb 3.2 oz (119.8 kg)   06/27/24 266 lb 11.2 oz (121 kg)   06/20/24 266 lb 3.2 oz (120.7 kg)   06/13/24 266 lb 3.2 oz (120.7 kg)   06/06/24 267 lb 8 oz (121.3 kg)     Micro Albumen/Creatinine:  No results found for: \"MICROALBCREA\", \"MALBCREACALC\"      A/P  59 year old man with complicated t2DM, hyperglycemia,nephropathy,  obese and DLP      Will resume CGM   UTD on foot exam and eye exam     Target A1c 6.5%  Target BG   BEFORE MEAL  100-130mg/dl  2hrs AFTER MEAL less than 180mg/dl    Plan  Pending urine labs   sent  dexcom reader, meter, strips, meds and higher dose mounjaro but will resume lower doses if cannot tolerate 12.5 mg/wk mounjaro     Metformin  1000 mg at night and 500 mg in the morning for a week, then increase to 1000 mg twice day. If getting diarrhea, wait and do not increase the dose till the diarrhea resolves.      Mounjaro  10->12.5 mg weekly    Pioglitzone 45  mg  Jardiance 25 mg   Basaglar 16 units   Crestor  20 mg/day      Target BG   BEFORE MEAL 100-130mg/dl  2hrs AFTER MEAL less than 180mg/dl    Check blood sugar fasting, before meals and before bedtime.   If you have low blood sugar <70, take 15 grams of carb (8 oz juice or regular soda) and recheck in 15 minutes.      Follow up with podiatry and eye doctor annually.       Patients need to wear covered shoes all the time and check feet daily.   Bring your meter/BG log to the next visit.         PAST MEDICAL HISTORY:   Past Medical History:    DM2 (diabetes mellitus, type 2) ([facility])    Dyslipidemia    Morbid obesity with BMI of 45.0-49.9, adult ([facility])      PAST SURGICAL HISTORY:   Past Surgical History:   Procedure Laterality Date    Carpal tunnel release Bilateral     Knee arthroscopy Bilateral      Knee surgery   Carpel tunnel      CURRENT MEDICATIONS:     Continuous Glucose Sensor (DEXCOM G7 SENSOR) Does not apply Misc 1 each Every 10 days. Use as directed every 10 days 9 each 1    metFORMIN  MG Oral Tablet 24 Hr Take 2 tablets (1,000 mg total) by mouth 2 (two) times daily with meals. 360 tablet 1    pioglitazone 45 MG Oral Tab Take 1 tablet (45 mg total) by mouth daily. 90 tablet 0    Insulin Pen Needle (PEN NEEDLES) 32G X 6 MM Does not apply Misc 1 each 4 (four) times daily. 100 each 2    Tirzepatide (MOUNJARO) 12.5 MG/0.5ML Subcutaneous Solution Auto-injector Inject 12.5 mg into the skin every 7 days. 6 mL 0    insulin glargine 100 UNIT/ML Subcutaneous Solution  Pen-injector Inject 20 Units into the skin daily. 15 mL 0    empagliflozin (JARDIANCE) 25 MG Oral Tab Take 1 tablet (25 mg total) by mouth daily. 90 tablet 1    Continuous Glucose  (DEXCOM G7 ) Does not apply Device 1 each one time for 1 dose. 1 each 0    Glucose Blood (BLOOD GLUCOSE TEST STRIPS 333) In Vitro Strip 1 each by In Vitro route 3 (three) times daily. 300 strip 1    Blood Gluc Meter Disp-Strips Does not apply Device Check BG 3x/day 1 each 0    Lancets 33G Does not apply Misc 1 each daily. 100 each 1       ALLERGIES:  No Known Allergies    SOCIAL HISTORY:    Social History     Socioeconomic History    Marital status:    Tobacco Use    Smoking status: Never    Smokeless tobacco: Never   Vaping Use    Vaping status: Never Used   Substance and Sexual Activity    Alcohol use: Yes     Comment: occasionally    Drug use: Never       FAMILY HISTORY:   Family History   Problem Relation Age of Onset    Other (Osteoarthritis) Mother     Other (Cognitive impairment) Mother     Bipolar Disorder Mother     Schizophrenia Mother     Other (TIA) Mother     Cancer Father         Throat      DM in GF  Mother with 2 TIAs   Aunt with dementia           PHYSICAL EXAM:   Height: --  Weight: --  BSA (Calculated - sq m): --  Pulse: --  BP: --  Temp: --  Do Not Use - Resp Rate: --  SpO2: --    There is no height or weight on file to calculate BMI.          CONSTITUTIONAL:  Awake and alert. Age appropriate, good hygiene not in acute distress. Well nourished and well developed. no acute distress   PSYCH:   Orientated to time, place, person & situation, Normal mood and affect, memory intact, normal insight and judgment, cooperative       DATA:     Pertinent data reviewed        3/13/24 10:40 AM    TSH  0.30 - 5.33 µIU/mL 0.96     3/13/24 10:40 AM    Total Cholesterol  0 - 199 mg/dL 152   Triglycerides  0.00 - 150.00 mg/dL 156 High        HDL Cholesterol  >40 mg/dL 41   LDL Cholesterol  0 - 99 mg/dL 86          Latest Reference Range & Units 24 15:51 24 15:52   HEMOGLOBIN A1C 4.3 - 5.6 %  8.9 !   GLUCOSE BLOOD  236    !: Data is abnormal      No results for input(s): \"TSH\", \"T4F\", \"T3F\", \"THYP\" in the last 72 hours.  No results found.          No orders of the defined types were placed in this encounter.    Orders Placed This Encounter    Continuous Glucose Sensor (DEXCOM G7 SENSOR) Does not apply Misc     Si each Every 10 days. Use as directed every 10 days     Dispense:  9 each     Refill:  1    metFORMIN  MG Oral Tablet 24 Hr     Sig: Take 2 tablets (1,000 mg total) by mouth 2 (two) times daily with meals.     Dispense:  360 tablet     Refill:  1    pioglitazone 45 MG Oral Tab     Sig: Take 1 tablet (45 mg total) by mouth daily.     Dispense:  90 tablet     Refill:  0    Insulin Pen Needle (PEN NEEDLES) 32G X 6 MM Does not apply Misc     Si each 4 (four) times daily.     Dispense:  100 each     Refill:  2    Tirzepatide (MOUNJARO) 12.5 MG/0.5ML Subcutaneous Solution Auto-injector     Sig: Inject 12.5 mg into the skin every 7 days.     Dispense:  6 mL     Refill:  0    insulin glargine 100 UNIT/ML Subcutaneous Solution Pen-injector     Sig: Inject 20 Units into the skin daily.     Dispense:  15 mL     Refill:  0    empagliflozin (JARDIANCE) 25 MG Oral Tab     Sig: Take 1 tablet (25 mg total) by mouth daily.     Dispense:  90 tablet     Refill:  1    Continuous Glucose  (DEXCOM G7 ) Does not apply Device     Si each one time for 1 dose.     Dispense:  1 each     Refill:  0    Glucose Blood (BLOOD GLUCOSE TEST STRIPS 333) In Vitro Strip     Si each by In Vitro route 3 (three) times daily.     Dispense:  300 strip     Refill:  1     The brand preferred by insurance. Thanks    Blood Gluc Meter Disp-Strips Does not apply Device     Sig: Check BG 3x/day     Dispense:  1 each     Refill:  0     Glucose meter and testing strips. The brand preferred by insurance. Thanks    Lancets  33G Does not apply Misc     Si each daily.     Dispense:  100 each     Refill:  1     The brand preferred by insurance. Thanks          This is a specialized patient consultation in endocrinology and required comprehensive review of prior records, as well as current evaluation, with time required for consideration of complex endocrine issues and consultation. For this visit, I personally interviewed the patient, and family member if accompanied, performed the pertinent parts of the history and physical examination. ROS included screening for appropriate endocrine conditions.   Today's diagnosis and plan were reviewed in detail with the patient who states understanding and agrees with plan. I discussed with the patient possible diagnosis, differential diagnosis, need for work up , treatment options, alternatives and side effects.     Please see note for details about time spent which includes:   · pre-visit preparation  · reviewing records  · face to face time with the patient   · timely documentation of the encounter  · ordering medications/tests  · communication with care team  · care coordination    I appreciate the opportunity to be part of your patient's medical care and will keep you, as the referring and primary physicians, informed about the care of your patient, including possible future surgery and pathology findings. Please feel free to contact me should you have any questions.      Rodney Staley MD

## 2024-11-27 NOTE — PATIENT INSTRUCTIONS
Pending urine labs   sent  dexcom reader, meter, strips, meds and higher dose mounjaro but will resume lower doses if cannot tolerate 12.5 mg/wk mounjaro     Metformin  1000 mg at night and 500 mg in the morning for a week, then increase to 1000 mg twice day. If getting diarrhea, wait and do not increase the dose till the diarrhea resolves.      Mounjaro  10->12.5 mg weekly    Pioglitzone 45  mg  Jardiance 25 mg   Basaglar 16 units   Crestor  20 mg/day      Target BG   BEFORE MEAL 100-130mg/dl  2hrs AFTER MEAL less than 180mg/dl    Check blood sugar fasting, before meals and before bedtime.   If you have low blood sugar <70, take 15 grams of carb (8 oz juice or regular soda) and recheck in 15 minutes.      Follow up with podiatry and eye doctor annually.

## 2024-11-29 ENCOUNTER — TELEPHONE (OUTPATIENT)
Dept: ENDOCRINOLOGY CLINIC | Facility: CLINIC | Age: 59
End: 2024-11-29

## 2024-11-29 DIAGNOSIS — E11.65 UNCONTROLLED TYPE 2 DIABETES MELLITUS WITH HYPERGLYCEMIA (HCC): Primary | ICD-10-CM

## 2024-11-29 NOTE — TELEPHONE ENCOUNTER
insulin glargine 100 UNIT/ML Subcutaneous Solution Pen-injector, Inject 20 Units into the skin daily., Disp: 15 mL, Rfl: 0    Pharmacy Message: The product listed above which has been prescribed for the patient is NOT AVAILABLE. Please check patients prescription plan for a covered alternate. (Possibly lantus pen). Please provide New Rx.

## 2024-11-29 NOTE — TELEPHONE ENCOUNTER
Medication PA Requested: Continuous Glucose  (DEXCOM G7 ) Does not apply Device                                                          CoverMyMeds Used: yes  Key:[id number]   Quantity: 1  Day Supply: 30  Sig: use to monitor blood glucose daily  DX Code:  E11.65                                   CPT code (if applicable):   Case Number/Pending Ref#:     Medication PA Requested:   Continuous Glucose Sensor (DEXCOM G7 SENSOR) Does not apply Misc                                                       CoverMyMeds Used: yes  Key:[id number]   Quantity:3  Day Supply:30  Si each every 10 days  DX Code:  E11.65                                   CPT code (if applicable):   Case Number/Pending Ref#:

## 2024-12-02 RX ORDER — INSULIN GLARGINE 100 [IU]/ML
20 INJECTION, SOLUTION SUBCUTANEOUS NIGHTLY
Qty: 15 ML | Refills: 0 | Status: SHIPPED | OUTPATIENT
Start: 2024-12-02

## 2024-12-02 NOTE — TELEPHONE ENCOUNTER
Pharmacy calling regards medication beng discontinued. Asking for alternative if ok with  Please call.

## 2024-12-02 NOTE — TELEPHONE ENCOUNTER
Spoke to rep at [facility] - glargine in on backorder with no expected date in stock  Per rep: lantus is in stock and will have $21 co-pay  Spoke to patient- he stated he has been taking lantus and would like to continue  RX sent

## 2024-12-04 ENCOUNTER — TELEPHONE (OUTPATIENT)
Dept: ENDOCRINOLOGY CLINIC | Facility: CLINIC | Age: 59
End: 2024-12-04

## 2024-12-04 DIAGNOSIS — E11.65 UNCONTROLLED TYPE 2 DIABETES MELLITUS WITH HYPERGLYCEMIA (HCC): Primary | ICD-10-CM

## 2024-12-04 NOTE — TELEPHONE ENCOUNTER
[facility] calling to inform that the patient has informed the pharmacy that he uses the Accu check Soft Clix Lancets, so pharmacy wants to confirm as the prescription only read Lancets. Please clarify.

## 2024-12-04 NOTE — TELEPHONE ENCOUNTER
Prior Authorization     KEY:  [id number]  Patient last name:  KATERINE LALA  :  [birth year]         Current Outpatient Medications   Medication Sig Dispense Refill    Continuous Glucose Sensor (DEXCOM G7 SENSOR) Does not apply Misc 1 each Every 10 days. Use as directed every 10 days

## 2024-12-05 RX ORDER — LANCETS
EACH MISCELLANEOUS
Qty: 300 EACH | Refills: 1 | Status: SHIPPED | OUTPATIENT
Start: 2024-12-05

## 2024-12-05 NOTE — TELEPHONE ENCOUNTER
Medication PA Requested:   Continuous Glucose Sensor (DEXCOM G7 SENSOR) Does not apply Misc                                                   CoverMyMeds Used: yes  Key: [id number]   Quantity:  3  Day Supply: 30   Si each, every 10 days. Use as directed every 10 days  DX Code:   E11. 65

## 2024-12-05 NOTE — TELEPHONE ENCOUNTER
Prescription sent for accu-chek softclix lancets    Endocrine Refill protocol for Glucose testing supplies     Protocol Criteria: PASSED Reason: N/A    If below requirement is met, send a 90-day supply with 1 refill per provider protocol.    Verify appointment with Endocrinology completed in the last 6 months or scheduled in the next 3 months.    Last completed office visit: 11/27/2024 Rodney Staley MD   Next scheduled Follow up:   Future Appointments   Date Time Provider Department Center   3/4/2025 12:00 PM [id number]

## 2024-12-05 NOTE — TELEPHONE ENCOUNTER
Medication PA Requested: Continuous Glucose  (DEXCOM G7 ) Does not apply Device                                                          CoverMyMeds Used: yes  Key:[id number]   Quantity: 1  Day Supply: 30  Sig: use to monitor blood glucose daily  DX Code:  E11.65                                   CPT code (if applicable):   Case Number/Pending Ref#:      Cover My Meds submitted, last office visit 11/27 and A1C 8/20  Awaiting determination

## 2024-12-05 NOTE — TELEPHONE ENCOUNTER
Evi from [facility] pharmacy calling back to speak with a nurse in regards to the patient's Accu check Soft Clix Lancets. Please call.   Reference # [id number]

## 2024-12-05 NOTE — TELEPHONE ENCOUNTER
Called [facility] at [phone], spoke with Julia LEOS, states she will fax prior authorization form for Dexcom G7 Sensor to provider's office. Per Julia, prior authorizations can only be faxed to numbers on file.  Once fax is received, please feel free to send to prior authorization department    Message to Endocrinology

## 2024-12-06 NOTE — TELEPHONE ENCOUNTER
Received fax from [employer] in regards to Dexcom G&  XX Device. Medication has been approved from 12/5/24 to 12/5/25. MyChart message sent to pt and approval letter sent to scanning.

## 2024-12-10 NOTE — TELEPHONE ENCOUNTER
Current Outpatient Medications   Medication Sig Dispense Refill                  Continuous Glucose Sensor (DEXCOM G7 SENSOR) Does not apply Misc 1 each Every 10 days. Use as directed every 10 days 9 each 1                               Key:[id number]

## 2025-02-26 DIAGNOSIS — E11.65 UNCONTROLLED TYPE 2 DIABETES MELLITUS WITH HYPERGLYCEMIA (HCC): ICD-10-CM

## 2025-02-26 DIAGNOSIS — R73.09 HIGH GLUCOSE LEVEL: ICD-10-CM

## 2025-02-26 DIAGNOSIS — I10 HTN (HYPERTENSION), BENIGN: ICD-10-CM

## 2025-02-26 DIAGNOSIS — E11.65 HYPERGLYCEMIA DUE TO DIABETES MELLITUS (HCC): ICD-10-CM

## 2025-02-26 DIAGNOSIS — E66.813 CLASS 3 SEVERE OBESITY WITH BODY MASS INDEX (BMI) OF 45.0 TO 49.9 IN ADULT, UNSPECIFIED OBESITY TYPE, UNSPECIFIED WHETHER SERIOUS COMORBIDITY PRESENT (HCC): ICD-10-CM

## 2025-02-26 DIAGNOSIS — E66.01 CLASS 3 SEVERE OBESITY WITH BODY MASS INDEX (BMI) OF 45.0 TO 49.9 IN ADULT, UNSPECIFIED OBESITY TYPE, UNSPECIFIED WHETHER SERIOUS COMORBIDITY PRESENT (HCC): ICD-10-CM

## 2025-02-26 DIAGNOSIS — E11.9 TYPE 2 DIABETES MELLITUS WITHOUT COMPLICATION, WITHOUT LONG-TERM CURRENT USE OF INSULIN (HCC): ICD-10-CM

## 2025-02-26 DIAGNOSIS — E78.5 DYSLIPIDEMIA: ICD-10-CM

## 2025-02-26 DIAGNOSIS — E11.649 HYPOGLYCEMIA ASSOCIATED WITH DIABETES (HCC): ICD-10-CM

## 2025-02-26 RX ORDER — ROSUVASTATIN CALCIUM 20 MG/1
20 TABLET, COATED ORAL NIGHTLY
Qty: 90 TABLET | Refills: 0 | Status: SHIPPED | OUTPATIENT
Start: 2025-02-26

## 2025-02-26 NOTE — TELEPHONE ENCOUNTER
Endocrine refill protocol for lipid lowering medications    Protocol Criteria:  PASSED Reason: N/A    If all below requirements are met, send a 90-day supply with 1 refill per provider protocol.    Verify appointment with Endocrinology completed in the last 6 months or scheduled in the next 3 months.  Lipid panel must have been completed in the last 12 months   ALT result below 80  LDL result below 130    Last completed office visit:11/27/2024 Rodney Staley MD   Next scheduled Follow up: no future appt mcm sent     Last Lipid panel date: 11/16/24    Last ALT result: 9

## 2025-03-03 DIAGNOSIS — E11.9 TYPE 2 DIABETES MELLITUS WITHOUT COMPLICATION, WITHOUT LONG-TERM CURRENT USE OF INSULIN (HCC): ICD-10-CM

## 2025-03-03 DIAGNOSIS — E11.649 HYPOGLYCEMIA ASSOCIATED WITH DIABETES (HCC): ICD-10-CM

## 2025-03-03 DIAGNOSIS — E11.65 HYPERGLYCEMIA DUE TO DIABETES MELLITUS (HCC): ICD-10-CM

## 2025-03-03 DIAGNOSIS — E66.01 CLASS 3 SEVERE OBESITY WITH BODY MASS INDEX (BMI) OF 45.0 TO 49.9 IN ADULT, UNSPECIFIED OBESITY TYPE, UNSPECIFIED WHETHER SERIOUS COMORBIDITY PRESENT (HCC): ICD-10-CM

## 2025-03-03 DIAGNOSIS — E11.65 UNCONTROLLED TYPE 2 DIABETES MELLITUS WITH HYPERGLYCEMIA (HCC): ICD-10-CM

## 2025-03-03 DIAGNOSIS — R73.09 HIGH GLUCOSE LEVEL: ICD-10-CM

## 2025-03-03 DIAGNOSIS — E78.5 DYSLIPIDEMIA: ICD-10-CM

## 2025-03-03 DIAGNOSIS — I10 HTN (HYPERTENSION), BENIGN: ICD-10-CM

## 2025-03-03 DIAGNOSIS — E66.813 CLASS 3 SEVERE OBESITY WITH BODY MASS INDEX (BMI) OF 45.0 TO 49.9 IN ADULT, UNSPECIFIED OBESITY TYPE, UNSPECIFIED WHETHER SERIOUS COMORBIDITY PRESENT (HCC): ICD-10-CM

## 2025-03-03 RX ORDER — INSULIN GLARGINE 100 [IU]/ML
20 INJECTION, SOLUTION SUBCUTANEOUS NIGHTLY
Qty: 15 ML | Refills: 0 | Status: SHIPPED | OUTPATIENT
Start: 2025-03-03

## 2025-03-03 NOTE — TELEPHONE ENCOUNTER
Endocrine refill protocol for basal insulins     Protocol Criteria: PASSED Reason: N/A    If all below requirements are met, send a 90-day supply with 1 refill per provider protocol.       Verify Appointment with Endocrinology completed in the last 6 months or scheduled in the next 3 months.  Verify A1C has been completed within the last 6 months and is below 8.5%     Last completed office visit:11/27/2024 Rodney Staley MD   Next scheduled Follow up:   Future Appointments   Date Time Provider Department Center   3/4/2025 12:00 PM Freddy Draper MD [id number]      Last A1c result: Last A1c value was 7% done 8/20/2024.

## 2025-03-03 NOTE — TELEPHONE ENCOUNTER
Patient needs refill new prescription insulin glargine (LANTUS SOLOSTAR) 100 UNIT/ML Subcutaneous Solution Pen-injector or the generic and also needs jardiance please follow up

## 2025-03-03 NOTE — TELEPHONE ENCOUNTER
Endocrine Refill protocol for oral and injectable diabetic medications    Protocol Criteria:  PASSED  Reason: N/A    If all below requirements are met, send a 90-day supply with 1 refill per provider protocol.    Verify appointment with Endocrinology completed in the last 6 months or scheduled in the next 3 months.  Verify A1C has been completed within the last 6 months and is below 8.5%     Last completed office visit: 11/27/2024 Rodney Staley MD   Next scheduled Follow up:   Future Appointments   Date Time Provider Department Center   3/4/2025 12:00 PM [id number]      Last A1c result: Last A1c value was 7% done 8/20/2024.

## 2025-03-04 ENCOUNTER — OFFICE VISIT (OUTPATIENT)
Dept: SURGERY | Facility: CLINIC | Age: 60
End: 2025-03-04
Payer: COMMERCIAL

## 2025-03-04 VITALS
RESPIRATION RATE: 16 BRPM | SYSTOLIC BLOOD PRESSURE: 110 MMHG | HEART RATE: 110 BPM | WEIGHT: 255 LBS | HEIGHT: 63 IN | DIASTOLIC BLOOD PRESSURE: 60 MMHG | OXYGEN SATURATION: 94 % | BODY MASS INDEX: 45.18 KG/M2

## 2025-03-04 DIAGNOSIS — I10 HTN (HYPERTENSION), BENIGN: ICD-10-CM

## 2025-03-04 DIAGNOSIS — E78.5 DYSLIPIDEMIA: ICD-10-CM

## 2025-03-04 DIAGNOSIS — E11.9 TYPE 2 DIABETES MELLITUS WITHOUT COMPLICATION, WITHOUT LONG-TERM CURRENT USE OF INSULIN (HCC): Primary | ICD-10-CM

## 2025-03-04 PROCEDURE — 99214 OFFICE O/P EST MOD 30 MIN: CPT | Performed by: INTERNAL MEDICINE

## 2025-03-04 PROCEDURE — 3008F BODY MASS INDEX DOCD: CPT | Performed by: INTERNAL MEDICINE

## 2025-03-04 PROCEDURE — 3074F SYST BP LT 130 MM HG: CPT | Performed by: INTERNAL MEDICINE

## 2025-03-04 PROCEDURE — 3078F DIAST BP <80 MM HG: CPT | Performed by: INTERNAL MEDICINE

## 2025-03-04 NOTE — PROGRESS NOTES
[facility], [facility], [place]  [address]  [place] [place] [address]  Dept: [phone]       Patient:  Fredi Glynn  :      10/30/1965  MRN:      [id number]    Chief Complaint:    Chief Complaint   Patient presents with    Follow - Up    Weight Management       SUBJECTIVE     History of Present Illness:  Fredi is being seen today for a follow-up for non surgical weight loss.     Past Medical History:   Past Medical History:    DM2 (diabetes mellitus, type 2) ([facility])    Dyslipidemia    Morbid obesity with BMI of 45.0-49.9, adult ([facility])        Comorbidities:  Joint pain-Improvement?  yes, GERD-Improvement?  yes, Diabetes-Improvement?  yes, ELÍAS-Improvement?  yes, Snoring-Improvement?  yes and Sleep apnea-Improvement?  yes    OBJECTIVE     Vitals: /60   Pulse 110   Resp 16   Ht 5' 3\" (1.6 m)   Wt 255 lb (115.7 kg)   SpO2 94%   BMI 45.17 kg/m²     Initial weight loss: -03   Total weight loss: -26   Start weight: 281    Wt Readings from Last 3 Encounters:   25 255 lb (115.7 kg)   24 258 lb (117 kg)   24 264 lb 3.2 oz (119.8 kg)       Patient Medications:    Current Outpatient Medications   Medication Sig Dispense Refill    insulin glargine (LANTUS SOLOSTAR) 100 UNIT/ML Subcutaneous Solution Pen-injector Inject 20 Units into the skin nightly. 15 mL 0    empagliflozin (JARDIANCE) 25 MG Oral Tab Take 1 tablet (25 mg total) by mouth daily. 90 tablet 1    ROSUVASTATIN 20 MG Oral Tab TAKE 1 TABLET NIGHTLY 90 tablet 0    Accu-Chek Softclix Lancets Does not apply Misc Use to check blood glucose 3 times daily. 300 each 1    Continuous Glucose Sensor (DEXCOM G7 SENSOR) Does not apply Misc 1 each Every 10 days. Use as directed every 10 days 9 each 1    metFORMIN  MG Oral Tablet 24 Hr Take 2 tablets (1,000 mg total) by mouth 2 (two) times daily with meals. 360 tablet 1    pioglitazone 45 MG Oral Tab Take 1 tablet (45 mg total) by mouth  daily. 90 tablet 0    Tirzepatide (MOUNJARO) 12.5 MG/0.5ML Subcutaneous Solution Auto-injector Inject 12.5 mg into the skin every 7 days. 6 mL 0    insulin glargine 100 UNIT/ML Subcutaneous Solution Pen-injector Inject 20 Units into the skin daily. 15 mL 0    Blood Gluc Meter Disp-Strips Does not apply Device Check BG 3x/day 1 each 0    Lancets 33G Does not apply Misc 1 each daily. 100 each 1    Accu-Chek FastClix Lancets Does not apply Misc Use to check blood sugar three times a day 300 each 1    Blood Gluc Meter Disp-Strips Does not apply Device Check BG 3x/day 1 each 0    montelukast 10 MG Oral Tab Take 1 tablet (10 mg total) by mouth daily.      ibuprofen 800 MG Oral Tab TAKE ONE TABLET BY MOUTH THREE TIMES DAILY FOR SEVERE PAIN (Patient not taking: Reported on 5/31/2024)      Esomeprazole Magnesium 40 MG Oral Capsule Delayed Release       lisinopril-hydroCHLOROthiazide 10-12.5 MG Oral Tab       fluticasone propionate 50 MCG/ACT Nasal Suspension        Allergies:  Patient has no known allergies.     Social History:    Social History     Socioeconomic History    Marital status:      Spouse name: Not on file    Number of children: Not on file    Years of education: Not on file    Highest education level: Not on file   Occupational History    Not on file   Tobacco Use    Smoking status: Never    Smokeless tobacco: Never   Vaping Use    Vaping status: Never Used   Substance and Sexual Activity    Alcohol use: Yes     Comment: occasionally    Drug use: Never    Sexual activity: Not on file   Other Topics Concern    Not on file   Social History Narrative    Not on file     Social Drivers of Health     Food Insecurity: Not on file   Transportation Needs: Not on file   Stress: Not on file   Housing Stability: Low Risk  (7/17/2021)    [facility]    Housing Stability     Mortgage Payment Concerns?: Not on file     Number of Places Lived in the Last  Year: Not on file     Unstable Housing?: Not on file     Surgical History:    Past Surgical History:   Procedure Laterality Date    Carpal tunnel release Bilateral     Knee arthroscopy Bilateral      Family History:    Family History   Problem Relation Age of Onset    Other (Osteoarthritis) Mother     Other (Cognitive impairment) Mother     Bipolar Disorder Mother     Schizophrenia Mother     Other (TIA) Mother     Cancer Father         Throat       Food Journal  Reviewed and Discussed:       Patient has a Food Journal?: yes   Patient is reading nutrition labels?  yes  Average Caloric Intake:     Average CHO Intake: 120  Is patient exercising? yes  Type of exercise? Continue going to the gym    Eating Habits  Patient states the following:  Eats 3 meal(s) per day  Length of time it takes to consume a meal:  20  # of snacks per day: 1 Type of snacks:  Meat and cheese  Amount of soda consumption per day:  diet  Amount of water (in ounces) per day:  inad  Drinking between meals only:  yes  Toughest challenge:  Exercise     Nutritional Goals  Limit carbohydrates to 100 gms per day, Eat 100-200 calories within 1 hour of waking  and Eat 3-4 cups of fresh fruits or vegetables daily    Behavior Modifications Reviewed and Discussed  Eat breakfast, Eat 3 meals per day, Plan meals in advance, Read nutrition labels, Drink 64 oz of water per day, Maintain a daily food journal, No drinking 30 minutes before or after meals, Utlize portion control strategies to reduce calorie intake, Identify triggers for eating and manage cues and Eat slowly and take 20 to 30 minutes to complete each meal    Exercise Goals Reviewed and Discussed    Continue with increase activity levels    ROS:    Constitutional: positive for fatigue  Respiratory: positive for dyspnea on exertion  Cardiovascular: negative  Gastrointestinal: negative  Musculoskeletal:positive for arthralgias and back pain  Neurological: negative  Behavioral/Psych:  negative  Endocrine: negative  All other systems were reviewed and are negative    Physical Exam:   General appearance: alert, appears stated age, cooperative and morbidly obese  Head: Normocephalic, without obvious abnormality, atraumatic  Lungs: clear to auscultation bilaterally  Heart: S1, S2 normal, no murmur, click, rub or gallop, regular rate and rhythm  Abdomen:  firm, obese, non tender  Extremities: extremities normal, atraumatic, no cyanosis or edema  Pulses: 2+ and symmetric  Skin: Skin color, texture, turgor normal. No rashes or lesions  Neurologic: Grossly normal    ASSESSMENT     DIABETES:  The patient denies any episodes of hypoglycemia since his last clinic visit.  he denies any lower extremity skin breakdown or foot ulcers.    HYPERTENSION:  The patient's blood pressure has been well controlled.  he has been checking it as instructed and has remained in relatively good control.    HYPERCHOLESTEROLEMIA:  The patient states that his cholesterol has been well controlled on his current medication.    No results found for: \"CHOLEST\", \"LDL\", \"HDL\", \"TRIG\", \"VLDL\", \"TCHDLRATIO\"    Encounter Diagnosis(ses):   Encounter Diagnoses   Name Primary?    Type 2 diabetes mellitus without complication, without long-term current use of insulin (HCC) Yes    HTN (hypertension), benign     Dyslipidemia     Body mass index (BMI) 45.0-49.9, adult (HCC)        PLAN   Given the patient's age, degree of obesity and multiple medical problems outlined above, I do believe that bariatric surgery may prove beneficial if the patient is unable to lose at least 20% of his weight after 6 months time. Further consideration for bariatric surgery will be discussed at upcoming clinic visits.        HYPERTENSION: Blood pressure stable on the above medications. No interval change in antihypertensive medication.     DIABETES: Continue current medications.    DYSLIPIDEMIA: Stable on the above prescribed meal plan and medication. Liver  function stable.    No results found for: \"CHOLEST\", \"LDL\", \"HDL\", \"TRIG\", \"VLDL\", \"TCHDLRATIO\"    OBSTRUCTIVE SLEEP APNEA: Given the patient's history suggestive of obstructive sleep apnea as outlined above, consideration for obtaining a sleep study may be warranted.      Goals for next month:  1. Keep a food log.  2. Drink 48-64 ounces of non-caloric beverages per day. No fruit juices or regular soda.  3. Increase activity-upper body exercises, walk 10 minutes per day.  4. Increase fruit and vegetable servings to 5-6 per day.        Wants to continue medical management for now     Mounjaro per Endo team    Should follow up with PCP as scheduled        Diagnoses and all orders for this visit:    Type 2 diabetes mellitus without complication, without long-term current use of insulin (HCC)    HTN (hypertension), benign    Dyslipidemia    Body mass index (BMI) 45.0-49.9, adult (HCC)          Freddy Draper MD

## 2025-08-12 ENCOUNTER — OFFICE VISIT (OUTPATIENT)
Facility: LOCATION | Age: 60
End: 2025-08-12

## 2025-08-12 VITALS
DIASTOLIC BLOOD PRESSURE: 72 MMHG | HEIGHT: 63 IN | BODY MASS INDEX: 45.89 KG/M2 | SYSTOLIC BLOOD PRESSURE: 112 MMHG | HEART RATE: 78 BPM | WEIGHT: 259 LBS

## 2025-08-12 DIAGNOSIS — E11.649 HYPOGLYCEMIA ASSOCIATED WITH DIABETES (HCC): ICD-10-CM

## 2025-08-12 DIAGNOSIS — E78.5 DYSLIPIDEMIA: ICD-10-CM

## 2025-08-12 DIAGNOSIS — E66.813 CLASS 3 SEVERE OBESITY WITH BODY MASS INDEX (BMI) OF 45.0 TO 49.9 IN ADULT, UNSPECIFIED OBESITY TYPE, UNSPECIFIED WHETHER SERIOUS COMORBIDITY PRESENT: ICD-10-CM

## 2025-08-12 DIAGNOSIS — I10 HTN (HYPERTENSION), BENIGN: ICD-10-CM

## 2025-08-12 DIAGNOSIS — E11.65 UNCONTROLLED TYPE 2 DIABETES MELLITUS WITH HYPERGLYCEMIA (HCC): Primary | ICD-10-CM

## 2025-08-12 DIAGNOSIS — E11.9 TYPE 2 DIABETES MELLITUS WITHOUT COMPLICATION, WITHOUT LONG-TERM CURRENT USE OF INSULIN (HCC): ICD-10-CM

## 2025-08-12 LAB
GLUCOSE BLOOD: 149
TEST STRIP LOT #: NORMAL NUMERIC

## 2025-08-12 PROCEDURE — 3074F SYST BP LT 130 MM HG: CPT | Performed by: INTERNAL MEDICINE

## 2025-08-12 PROCEDURE — 82947 ASSAY GLUCOSE BLOOD QUANT: CPT | Performed by: INTERNAL MEDICINE

## 2025-08-12 PROCEDURE — 3078F DIAST BP <80 MM HG: CPT | Performed by: INTERNAL MEDICINE

## 2025-08-12 PROCEDURE — 99214 OFFICE O/P EST MOD 30 MIN: CPT | Performed by: INTERNAL MEDICINE

## 2025-08-12 PROCEDURE — 3008F BODY MASS INDEX DOCD: CPT | Performed by: INTERNAL MEDICINE

## 2025-08-12 RX ORDER — METFORMIN HYDROCHLORIDE 500 MG/1
1000 TABLET, EXTENDED RELEASE ORAL 2 TIMES DAILY WITH MEALS
Qty: 360 TABLET | Refills: 3 | Status: SHIPPED | OUTPATIENT
Start: 2025-08-12

## 2025-08-12 RX ORDER — TIRZEPATIDE 10 MG/.5ML
10 INJECTION, SOLUTION SUBCUTANEOUS
Qty: 18 ML | Refills: 2 | Status: SHIPPED | OUTPATIENT
Start: 2025-08-12

## 2025-08-12 RX ORDER — INSULIN GLARGINE 100 [IU]/ML
20 INJECTION, SOLUTION SUBCUTANEOUS NIGHTLY
Qty: 15 ML | Refills: 3 | Status: SHIPPED | OUTPATIENT
Start: 2025-08-12

## 2025-08-12 RX ORDER — BLOOD SUGAR DIAGNOSTIC
1 STRIP MISCELLANEOUS 4 TIMES DAILY
Qty: 100 EACH | Refills: 2 | Status: SHIPPED | OUTPATIENT
Start: 2025-08-12 | End: 2025-11-10

## 2025-08-12 RX ORDER — ACYCLOVIR 400 MG/1
1 TABLET ORAL ONCE
Qty: 1 EACH | Refills: 0 | Status: SHIPPED | OUTPATIENT
Start: 2025-08-12 | End: 2025-08-12

## 2025-08-12 RX ORDER — PIOGLITAZONE 45 MG/1
45 TABLET ORAL DAILY
Qty: 90 TABLET | Refills: 3 | Status: SHIPPED | OUTPATIENT
Start: 2025-08-12

## 2025-08-12 RX ORDER — TIRZEPATIDE 10 MG/.5ML
10 INJECTION, SOLUTION SUBCUTANEOUS
Qty: 18 ML | Refills: 2 | Status: SHIPPED | OUTPATIENT
Start: 2025-08-12 | End: 2025-08-12

## 2025-08-12 RX ORDER — ACYCLOVIR 400 MG/1
1 TABLET ORAL
Qty: 3 EACH | Refills: 10 | Status: SHIPPED | OUTPATIENT
Start: 2025-08-12 | End: 2026-08-07

## (undated) NOTE — Clinical Note
He attended seminar He had an issue with Tiffany regarding an old bill he never got billed for. Would like to stay with Dr Luis Armando Merritt and work with Eduardo Smith. Please let me know how to move him forward.    Dr Hollie Pimentel